# Patient Record
Sex: FEMALE | Race: WHITE | NOT HISPANIC OR LATINO | Employment: UNEMPLOYED | URBAN - NONMETROPOLITAN AREA
[De-identification: names, ages, dates, MRNs, and addresses within clinical notes are randomized per-mention and may not be internally consistent; named-entity substitution may affect disease eponyms.]

---

## 2018-12-04 ENCOUNTER — APPOINTMENT (OUTPATIENT)
Dept: GENERAL RADIOLOGY | Facility: HOSPITAL | Age: 38
End: 2018-12-04

## 2018-12-04 ENCOUNTER — HOSPITAL ENCOUNTER (EMERGENCY)
Facility: HOSPITAL | Age: 38
Discharge: HOME | End: 2018-12-04
Attending: EMERGENCY MEDICINE | Admitting: EMERGENCY MEDICINE

## 2018-12-04 VITALS
WEIGHT: 101 LBS | HEART RATE: 73 BPM | RESPIRATION RATE: 18 BRPM | OXYGEN SATURATION: 99 % | DIASTOLIC BLOOD PRESSURE: 92 MMHG | HEIGHT: 60 IN | SYSTOLIC BLOOD PRESSURE: 126 MMHG | BODY MASS INDEX: 19.83 KG/M2 | TEMPERATURE: 98.1 F

## 2018-12-04 DIAGNOSIS — S62.327A CLOSED DISPLACED FRACTURE OF SHAFT OF FIFTH METACARPAL BONE OF LEFT HAND, INITIAL ENCOUNTER: Primary | ICD-10-CM

## 2018-12-04 PROCEDURE — 96372 THER/PROPH/DIAG INJ SC/IM: CPT

## 2018-12-04 PROCEDURE — 73130 X-RAY EXAM OF HAND: CPT

## 2018-12-04 PROCEDURE — 99284 EMERGENCY DEPT VISIT MOD MDM: CPT

## 2018-12-04 PROCEDURE — 73090 X-RAY EXAM OF FOREARM: CPT

## 2018-12-04 PROCEDURE — 73080 X-RAY EXAM OF ELBOW: CPT

## 2018-12-04 PROCEDURE — 25010000002 MORPHINE PER 10 MG: Performed by: EMERGENCY MEDICINE

## 2018-12-04 PROCEDURE — 73110 X-RAY EXAM OF WRIST: CPT

## 2018-12-04 RX ORDER — MORPHINE SULFATE 2 MG/ML
2 INJECTION, SOLUTION INTRAMUSCULAR; INTRAVENOUS ONCE
Status: COMPLETED | OUTPATIENT
Start: 2018-12-04 | End: 2018-12-04

## 2018-12-04 RX ORDER — ONDANSETRON 4 MG/1
4 TABLET, ORALLY DISINTEGRATING ORAL ONCE
Status: COMPLETED | OUTPATIENT
Start: 2018-12-04 | End: 2018-12-04

## 2018-12-04 RX ORDER — ONDANSETRON 4 MG/1
4 TABLET, ORALLY DISINTEGRATING ORAL EVERY 6 HOURS PRN
Qty: 20 TABLET | Refills: 0 | Status: SHIPPED | OUTPATIENT
Start: 2018-12-04 | End: 2018-12-09

## 2018-12-04 RX ORDER — HYDROCODONE BITARTRATE AND ACETAMINOPHEN 7.5; 325 MG/1; MG/1
1 TABLET ORAL EVERY 6 HOURS PRN
Qty: 12 TABLET | Refills: 0 | Status: SHIPPED | OUTPATIENT
Start: 2018-12-04 | End: 2018-12-07

## 2018-12-04 RX ORDER — HYDROCODONE BITARTRATE AND ACETAMINOPHEN 7.5; 325 MG/1; MG/1
1 TABLET ORAL ONCE
Status: COMPLETED | OUTPATIENT
Start: 2018-12-04 | End: 2018-12-04

## 2018-12-04 RX ADMIN — ONDANSETRON 4 MG: 4 TABLET, ORALLY DISINTEGRATING ORAL at 09:57

## 2018-12-04 RX ADMIN — HYDROCODONE BITARTRATE AND ACETAMINOPHEN 1 TABLET: 7.5; 325 TABLET ORAL at 09:58

## 2018-12-04 RX ADMIN — MORPHINE SULFATE 2 MG: 2 INJECTION, SOLUTION INTRAMUSCULAR; INTRAVENOUS at 11:09

## 2018-12-04 NOTE — ED PROVIDER NOTES
Subjective      used: No    Upper Extremity Issue   Location:  Hand  Hand location:  L hand  Injury: yes    Time since incident:  2 hours  Mechanism of injury: fall    Fall:     Fall occurred:  Down stairs    Impact surface:  Grass    Point of impact:  Hands    Entrapped after fall: no    Pain details:     Quality:  Throbbing    Radiates to:  L wrist    Severity:  Moderate    Onset quality:  Sudden    Duration:  2 hours    Timing:  Constant    Progression:  Worsening  Handedness:  Right-handed  Dislocation: no    Foreign body present:  No foreign bodies  Tetanus status:  Up to date  Prior injury to area:  No  Relieved by:  Nothing  Worsened by:  Movement  Ineffective treatments:  Acetaminophen  Associated symptoms: decreased range of motion and swelling    Associated symptoms: no back pain, no fatigue, no fever, no muscle weakness, no neck pain, no numbness, no stiffness and no tingling    Risk factors: no concern for non-accidental trauma, no known bone disorder, no frequent fractures and no recent illness        Review of Systems   Constitutional: Negative.  Negative for fatigue and fever.   HENT: Negative.    Eyes: Negative.    Respiratory: Negative.    Cardiovascular: Negative.    Gastrointestinal: Negative.    Endocrine: Negative.    Genitourinary: Negative.    Musculoskeletal: Negative for arthralgias, back pain, gait problem, joint swelling, myalgias, neck pain, neck stiffness and stiffness.        Left hand and wrist pain   Skin: Negative.    Allergic/Immunologic: Negative.    Neurological: Negative.    Hematological: Negative.    Psychiatric/Behavioral: Negative.    All other systems reviewed and are negative.      History reviewed. No pertinent past medical history.    No Known Allergies    History reviewed. No pertinent surgical history.    History reviewed. No pertinent family history.    Social History     Socioeconomic History   • Marital status: Legally      Spouse name:  Not on file   • Number of children: Not on file   • Years of education: Not on file   • Highest education level: Not on file   Tobacco Use   • Smoking status: Current Every Day Smoker     Packs/day: 0.50     Types: Cigarettes   Substance and Sexual Activity   • Alcohol use: No     Frequency: Never   • Drug use: No   • Sexual activity: Defer           Objective   Physical Exam   Constitutional: She is oriented to person, place, and time. She appears well-developed and well-nourished. No distress.   HENT:   Head: Normocephalic and atraumatic.   Mouth/Throat: No oropharyngeal exudate.   Eyes: Conjunctivae and EOM are normal. Pupils are equal, round, and reactive to light. Right eye exhibits no discharge. Left eye exhibits no discharge. No scleral icterus.   Neck: Normal range of motion. Neck supple. No JVD present. No tracheal deviation present. No thyromegaly present.   Cardiovascular: Normal rate, regular rhythm, normal heart sounds and intact distal pulses. Exam reveals no gallop and no friction rub.   No murmur heard.  Pulmonary/Chest: Effort normal and breath sounds normal. No stridor. No respiratory distress. She has no wheezes. She has no rales. She exhibits no tenderness.   Abdominal: Soft. Bowel sounds are normal. She exhibits no distension and no mass. There is no tenderness. There is no rebound and no guarding. No hernia.   Musculoskeletal: She exhibits edema and tenderness. She exhibits no deformity.        Left hand: She exhibits decreased range of motion, tenderness, bony tenderness and swelling. She exhibits normal capillary refill, no deformity and no laceration. Normal sensation noted. Decreased sensation is not present in the ulnar distribution, is not present in the medial redistribution and is not present in the radial distribution. Normal strength noted. She exhibits no finger abduction, no thumb/finger opposition and no wrist extension trouble.        Hands:  Lymphadenopathy:     She has no  cervical adenopathy.   Neurological: She is alert and oriented to person, place, and time. She has normal reflexes. She displays normal reflexes. No cranial nerve deficit or sensory deficit. She exhibits normal muscle tone. Coordination normal.   Skin: Skin is warm and dry. Capillary refill takes less than 2 seconds. No rash noted. She is not diaphoretic. No erythema. No pallor.   Psychiatric: She has a normal mood and affect. Her behavior is normal. Judgment and thought content normal.   Nursing note and vitals reviewed.      Procedures           ED Course      Xr Elbow 3+ View Left    Result Date: 12/4/2018  Narrative: Three view left elbow HISTORY: Fell AP, lateral and oblique views obtained. COMPARISON: None No fracture or dislocation. No joint effusion. No other osseous or articular abnormality.     Impression: CONCLUSION: No fracture or dislocation [id number] Electronically signed by:  Beto Khan MD  12/4/2018 10:23 AM CST Workstation: [id number]    Xr Forearm 2 View Left    Result Date: 12/4/2018  Narrative: Two view left forearm HISTORY: Fell AP and lateral views obtained. COMPARISON: None No fracture or dislocation. No other osseous or articular abnormality.     Impression: CONCLUSION: No fracture or dislocation [id number] Electronically signed by:  Beto Khan MD  12/4/2018 10:22 AM CST Workstation: [id number]    Xr Wrist 3+ View Left    Result Date: 12/4/2018  Narrative: Three view left wrist HISTORY: Fell AP, lateral and oblique views obtained. COMPARISON: None Displaced oblique fracture base and proximal shaft of the fifth metacarpal. No dislocation. No other osseous or articular abnormality.     Impression: CONCLUSION: Displaced oblique fracture base and proximal shaft of the fifth metacarpal. [id number] Electronically signed by:  Beto Khan MD  12/4/2018 10:25 AM CST Workstation: [id number]    Xr Hand 3+ View Left    Result Date: 12/4/2018  Narrative: Three view left hand HISTORY: Fell AP,  lateral and oblique views obtained. COMPARISON: None Displaced oblique fracture base and proximal shaft of the fifth metacarpal. No dislocation. No other osseous or articular abnormality.     Impression: CONCLUSION: Displaced oblique fracture base and proximal shaft of the fifth metacarpal. [id number] Electronically signed by:  Beto Khan MD  12/4/2018 10:26 AM [facility] Workstation: [id number]                [facility]      Final diagnoses:   Closed displaced fracture of shaft of fifth metacarpal bone of left hand, initial encounter            Danni Jaquez PA  12/04/18 1109

## 2018-12-05 ENCOUNTER — OFFICE VISIT (OUTPATIENT)
Dept: ORTHOPEDIC SURGERY | Facility: CLINIC | Age: 38
End: 2018-12-05

## 2018-12-05 VITALS — WEIGHT: 101 LBS | HEIGHT: 60 IN | BODY MASS INDEX: 19.83 KG/M2

## 2018-12-05 DIAGNOSIS — S62.317A CLOSED DISPLACED FRACTURE OF BASE OF FIFTH METACARPAL BONE OF LEFT HAND, INITIAL ENCOUNTER: Primary | ICD-10-CM

## 2018-12-05 PROBLEM — S62.318A CLOSED FRACTURE OF BASE OF FIFTH METACARPAL BONE: Status: ACTIVE | Noted: 2018-12-05

## 2018-12-05 PROCEDURE — 26600 TREAT METACARPAL FRACTURE: CPT | Performed by: ORTHOPAEDIC SURGERY

## 2018-12-05 PROCEDURE — 99203 OFFICE O/P NEW LOW 30 MIN: CPT | Performed by: ORTHOPAEDIC SURGERY

## 2018-12-05 NOTE — PROGRESS NOTES
Viridiana Mota is a 38 y.o. female   Primary provider:  Provider, No Known       Chief Complaint   Patient presents with   • Left Hand - Pain       HISTORY OF PRESENT ILLNESS: Patient is here today for left hand pain. Patient hit her hand on a metal wagon after falling down [employer]airs. Patient was seen at [facility] ER where xrays were taken. Patient states that her pain is 3/10 today.  She was placed in a splint and referred here.    History of Present Illness     CONCURRENT MEDICAL HISTORY:    No past medical history on file.    No Known Allergies      Current Outpatient Medications:   •  HYDROcodone-acetaminophen (NORCO) 7.5-325 MG per tablet, Take 1 tablet by mouth Every 6 (Six) Hours As Needed for Moderate Pain  for up to 3 days., Disp: 12 tablet, Rfl: 0  •  ondansetron ODT (ZOFRAN-ODT) 4 MG disintegrating tablet, Take 1 tablet by mouth Every 6 (Six) Hours As Needed for Nausea or Vomiting for up to 5 days., Disp: 20 tablet, Rfl: 0    No past surgical history on file.    No family history on file.     Social History     Socioeconomic History   • Marital status: Legally      Spouse name: Not on file   • Number of children: Not on file   • Years of education: Not on file   • Highest education level: Not on file   Social Needs   • Financial resource strain: Not on file   • Food insecurity - worry: Not on file   • Food insecurity - inability: Not on file   • Transportation needs - medical: Not on file   • Transportation needs - non-medical: Not on file   Occupational History   • Not on file   Tobacco Use   • Smoking status: Current Every Day Smoker     Packs/day: 0.50     Types: Cigarettes   Substance and Sexual Activity   • Alcohol use: No     Frequency: Never   • Drug use: No   • Sexual activity: Defer   Other Topics Concern   • Not on file   Social History Narrative   • Not on file        Review of Systems   Constitutional: Negative for chills and fever.   HENT: Negative for facial swelling.   "  Respiratory: Negative for apnea and shortness of breath.    Cardiovascular: Negative for chest pain and leg swelling.   Gastrointestinal: Negative for abdominal pain, nausea and vomiting.   Genitourinary: Negative for dysuria.   Musculoskeletal: Positive for joint swelling.   Skin: Negative for color change.   Neurological: Negative for seizures and syncope.   Hematological: Negative for adenopathy.   Psychiatric/Behavioral: Negative for dysphoric mood.       PHYSICAL EXAMINATION:       Ht 152.4 cm (60\")   Wt 45.8 kg (101 lb)   BMI 19.73 kg/m²     Physical Exam   Constitutional: She is oriented to person, place, and time. She appears well-developed and well-nourished.   HENT:   Head: Normocephalic and atraumatic.   Eyes: EOM are normal. Pupils are equal, round, and reactive to light.   Neck: Neck supple.   Pulmonary/Chest: Effort normal.   Musculoskeletal: She exhibits tenderness and deformity.   Neurological: She is alert and oriented to person, place, and time.   Skin: Skin is warm and dry.   Psychiatric: She has a normal mood and affect.   Vitals reviewed.      GAIT:     [x]  Normal  []  Antalgic    Assistive device: []  None  []  Walker     []  Crutches  []  Cane     []  Wheelchair  []  Stretcher    Ortho Exam   Swollen and tender.  No rotational deformity.  Neurologically intact.  Exquisitely tender over the fourth and fifth metacarpal carpal.  Range of motion is decreased secondary to pain.    Xr Elbow 3+ View Left    Result Date: 12/4/2018  Narrative: Three view left elbow HISTORY: Fell AP, lateral and oblique views obtained. COMPARISON: None No fracture or dislocation. No joint effusion. No other osseous or articular abnormality.     Impression: CONCLUSION: No fracture or dislocation [id number] Electronically signed by:  Beto Khan MD  12/4/2018 10:23 AM [facility] Workstation: [id number]    Xr Forearm 2 View Left    Result Date: 12/4/2018  Narrative: Two view left forearm HISTORY: Fell AP and lateral views " obtained. COMPARISON: None No fracture or dislocation. No other osseous or articular abnormality.     Impression: CONCLUSION: No fracture or dislocation [id number] Electronically signed by:  Beto Khan MD  12/4/2018 10:22 AM CST Workstation: [id number]    Xr Wrist 3+ View Left    Result Date: 12/4/2018  Narrative: Three view left wrist HISTORY: Fell AP, lateral and oblique views obtained. COMPARISON: None Displaced oblique fracture base and proximal shaft of the fifth metacarpal. No dislocation. No other osseous or articular abnormality.     Impression: CONCLUSION: Displaced oblique fracture base and proximal shaft of the fifth metacarpal. [id number] Electronically signed by:  Beto Khan MD  12/4/2018 10:25 AM CST Workstation: [id number]    Xr Hand 3+ View Left    Result Date: 12/4/2018  Narrative: Three view left hand HISTORY: Fell AP, lateral and oblique views obtained. COMPARISON: None Displaced oblique fracture base and proximal shaft of the fifth metacarpal. No dislocation. No other osseous or articular abnormality.     Impression: CONCLUSION: Displaced oblique fracture base and proximal shaft of the fifth metacarpal. [id number] Electronically signed by:  Beto Khan MD  12/4/2018 10:26 AM [facility] Workstation: [employer]    I reviewed this x-ray and agree I believe she also has a fracture of the base the fourth which is not indicated on this report      ASSESSMENT:    Diagnoses and all orders for this visit:    Closed displaced fracture of base of fifth metacarpal bone of left hand, initial encounter          PLAN I discussed operative versus nonoperative treatment.  The patient really adamant about not wanting to have anything done operatively.  We splinted her on a gutter splint repeat an x-ray in 3 weeks, sooner with any problems the meantime.  End of dict    No Follow-up on file.    Medardo Hagan MD

## 2018-12-21 DIAGNOSIS — S62.317A CLOSED DISPLACED FRACTURE OF BASE OF FIFTH METACARPAL BONE OF LEFT HAND, INITIAL ENCOUNTER: Primary | ICD-10-CM

## 2018-12-26 ENCOUNTER — OFFICE VISIT (OUTPATIENT)
Dept: ORTHOPEDIC SURGERY | Facility: CLINIC | Age: 38
End: 2018-12-26

## 2018-12-26 VITALS — BODY MASS INDEX: 25.13 KG/M2 | WEIGHT: 128 LBS | HEIGHT: 60 IN

## 2018-12-26 DIAGNOSIS — S62.317D CLOSED DISPLACED FRACTURE OF BASE OF FIFTH METACARPAL BONE OF LEFT HAND WITH ROUTINE HEALING, SUBSEQUENT ENCOUNTER: Primary | ICD-10-CM

## 2018-12-26 PROCEDURE — 99024 POSTOP FOLLOW-UP VISIT: CPT | Performed by: ORTHOPAEDIC SURGERY

## 2018-12-26 NOTE — PROGRESS NOTES
"Viridiana Mota is a 38 y.o. female returns for     Chief Complaint   Patient presents with   • Left Hand - Follow-up       HISTORY OF PRESENT ILLNESS: Patient is here today for follow up of left hand fracture. Patient was sent to xray upon arrival and states that she has no pain.        CONCURRENT MEDICAL HISTORY:    The following portions of the patient's history were reviewed and updated as appropriate: allergies, current medications, past family history, past medical history, past social history, past surgical history and problem list.     ROS  No fevers or chills.  No chest pain or shortness of air.  No GI or  disturbances.    PHYSICAL EXAMINATION:       Ht 152.4 cm (60\")   Wt 58.1 kg (128 lb)   BMI 25.00 kg/m²     Physical Exam    GAIT:     []  Normal  []  Antalgic    Assistive device: []  None  []  Walker     []  Crutches  []  Cane     []  Wheelchair  []  Stretcher    Ortho Exam  Mild rotational deformity is noted of the fifth metacarpal.  She overlaps when flexing her fingers.  It is fairly mild.  Neurologically intact.  Tender at the base of the fifth.    Xr Elbow 3+ View Left    Result Date: 12/4/2018  Narrative: Three view left elbow HISTORY: Fell AP, lateral and oblique views obtained. COMPARISON: None No fracture or dislocation. No joint effusion. No other osseous or articular abnormality.     Impression: CONCLUSION: No fracture or dislocation [id number] Electronically signed by:  Beto Khan MD  12/4/2018 10:23 AM CST Workstation: [employer]    Xr Forearm 2 View Left    Result Date: 12/4/2018  Narrative: Two view left forearm HISTORY: Fell AP and lateral views obtained. COMPARISON: None No fracture or dislocation. No other osseous or articular abnormality.     Impression: CONCLUSION: No fracture or dislocation [id number] Electronically signed by:  Beto Khan MD  12/4/2018 10:22 AM CST Workstation: [id number]    Xr Wrist 3+ View Left    Result Date: 12/4/2018  Narrative: Three view left wrist " HISTORY: Fell AP, lateral and oblique views obtained. COMPARISON: None Displaced oblique fracture base and proximal shaft of the fifth metacarpal. No dislocation. No other osseous or articular abnormality.     Impression: CONCLUSION: Displaced oblique fracture base and proximal shaft of the fifth metacarpal. [id number] Electronically signed by:  Beto Khan MD  12/4/2018 10:25 AM CST Workstation: [id number]    Xr Hand 3+ View Left    Result Date: 12/4/2018  Narrative: Three view left hand HISTORY: Fell AP, lateral and oblique views obtained. COMPARISON: None Displaced oblique fracture base and proximal shaft of the fifth metacarpal. No dislocation. No other osseous or articular abnormality.     Impression: CONCLUSION: Displaced oblique fracture base and proximal shaft of the fifth metacarpal. [id number] Electronically signed by:  Beto Khan MD  12/4/2018 10:26 AM CST Workstation: [id number]       X-rays show no significant changes as is still offset of the fracture no significant callus is noted.  After I have      ASSESSMENT:    Diagnoses and all orders for this visit:    Closed displaced fracture of base of fifth metacarpal bone of left hand with routine healing, subsequent encounter          PLAN I have told her that the rotational deformity and demonstrated this to her.  I told that she is also not healed yet.  It she can come out starting in gentle range of motion when she is active she should have the brace on for another 3 weeks.  She'll call me with any issues.  I have told her that she has any issue at all after 4-6 weeks she should call me and this would indicate a delayed union.  She'll let me know if any problems.  She needs protective soaks for the next 3 weeks.  She does understand and demonstrated the overlap.    No Follow-up on file.    Medardo Hagan MD

## 2019-01-15 DIAGNOSIS — S62.317D CLOSED DISPLACED FRACTURE OF BASE OF FIFTH METACARPAL BONE OF LEFT HAND WITH ROUTINE HEALING, SUBSEQUENT ENCOUNTER: Primary | ICD-10-CM

## 2020-02-04 ENCOUNTER — OFFICE VISIT (OUTPATIENT)
Dept: FAMILY MEDICINE CLINIC | Facility: CLINIC | Age: 40
End: 2020-02-04

## 2020-02-04 ENCOUNTER — LAB (OUTPATIENT)
Dept: LAB | Facility: HOSPITAL | Age: 40
End: 2020-02-04

## 2020-02-04 VITALS
HEART RATE: 72 BPM | SYSTOLIC BLOOD PRESSURE: 116 MMHG | BODY MASS INDEX: 29.09 KG/M2 | TEMPERATURE: 98.5 F | HEIGHT: 60 IN | DIASTOLIC BLOOD PRESSURE: 88 MMHG | OXYGEN SATURATION: 99 % | WEIGHT: 148.2 LBS

## 2020-02-04 DIAGNOSIS — Z76.89 ENCOUNTER TO ESTABLISH CARE WITH NEW DOCTOR: ICD-10-CM

## 2020-02-04 DIAGNOSIS — F32.A DEPRESSION, UNSPECIFIED DEPRESSION TYPE: ICD-10-CM

## 2020-02-04 DIAGNOSIS — M19.90 ARTHRITIS: ICD-10-CM
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
## 2020-02-04 PROCEDURE — 86618 LYME DISEASE ANTIBODY: CPT

## 2020-02-04 PROCEDURE — 85025 COMPLETE CBC W/AUTO DIFF WBC: CPT

## 2020-02-04 PROCEDURE — 81003 URINALYSIS AUTO W/O SCOPE: CPT

## 2020-02-04 PROCEDURE — 84443 ASSAY THYROID STIM HORMONE: CPT

## 2020-02-04 PROCEDURE — 99204 OFFICE O/P NEW MOD 45 MIN: CPT | Performed by: FAMILY MEDICINE

## 2020-02-04 PROCEDURE — 80061 LIPID PANEL: CPT

## 2020-02-04 PROCEDURE — 80053 COMPREHEN METABOLIC PANEL: CPT

## 2020-02-04 RX ORDER — DULOXETIN HYDROCHLORIDE 30 MG/1
30 CAPSULE, DELAYED RELEASE ORAL DAILY
Qty: 30 CAPSULE | Refills: 5 | Status: SHIPPED | OUTPATIENT
Start: 2020-02-04 | End: 2020-10-02

## 2020-02-04 RX ORDER — DOXYCYCLINE HYCLATE 100 MG
100 TABLET ORAL 2 TIMES DAILY
Qty: 28 TABLET | Refills: 0 | Status: SHIPPED | OUTPATIENT
Start: 2020-02-04 | End: 2020-06-30

## 2020-02-04 NOTE — PROGRESS NOTES
Subjective No PCP  Viridianaruben Mota is a 39 y.o. overweight  white female.G2-P2, recently bitten by tick with a bulls -eye rash, no tx, now Joints hurt, darrius knees, having headache and fatigue. H/O Anxiety -Depression( seen at [facility]) . Presents to est with a PCP  ' Need check-up, evaluation of problems'    Depression   Visit Type: initial  Onset of symptoms: more than 5 years ago  Progression since onset: waxing and waning  Patient presents with the following symptoms: depressed mood and nervousness/anxiety.  Patient is not experiencing: choking sensation, confusion, decreased concentration, palpitations, shortness of breath and suicidal ideas.  Frequency of symptoms: occasionally   Patient has a history of: depression  Treatment tried: SSRI, lifestyle changes and counseling (CBT)  Compliance with treatment: good  Improvement on treatment: moderate      Pain   This is a new (Joint pain after Tick bite) problem. The current episode started 1 to 4 weeks ago. The problem occurs daily. The problem has been waxing and waning. Associated symptoms include arthralgias, congestion, fatigue, joint swelling and myalgias. Pertinent negatives include no abdominal pain, chest pain, chills, coughing, diaphoresis, fever, headaches, nausea, neck pain, numbness, rash, sore throat, vomiting or weakness. She has tried acetaminophen and NSAIDs for the symptoms. The treatment provided mild relief.   Fatigue   This is a new (30 lb weight gain) problem. The current episode started more than 1 month ago. Associated symptoms include arthralgias, congestion, fatigue, joint swelling and myalgias. Pertinent negatives include no abdominal pain, chest pain, chills, coughing, diaphoresis, fever, headaches, nausea, neck pain, numbness, rash, sore throat, vomiting or weakness. The symptoms are aggravated by eating. She has tried nothing for the symptoms. The treatment provided no relief.        The following portions of the patient's history were  reviewed and updated as appropriate: allergies, current medications, past family history, past medical history, past social history, past surgical history and problem list.    Review of Systems   Constitutional: Positive for fatigue. Negative for activity change, appetite change, chills, diaphoresis, fever and unexpected weight change.        30 lb   HENT: Positive for congestion, sinus pressure and sinus pain. Negative for dental problem, drooling, ear discharge, ear pain, facial swelling, hearing loss, mouth sores, nosebleeds, postnasal drip, rhinorrhea, sneezing, sore throat, tinnitus, trouble swallowing and voice change.    Eyes: Negative for photophobia, pain, discharge, redness, itching and visual disturbance.        Eye exam 1 yr, OK  Astigmatism.   Respiratory: Negative for apnea, cough, choking, chest tightness, shortness of breath, wheezing and stridor.         1 cig/day   Cardiovascular: Negative for chest pain, palpitations and leg swelling.   Gastrointestinal: Negative for abdominal distention, abdominal pain, anal bleeding, blood in stool, constipation, diarrhea, nausea, rectal pain and vomiting.   Endocrine: Negative for cold intolerance, heat intolerance, polydipsia, polyphagia and polyuria.   Genitourinary: Positive for enuresis. Negative for decreased urine volume, difficulty urinating, dyspareunia, dysuria, flank pain, frequency, genital sores, hematuria, menstrual problem, pelvic pain, urgency, vaginal bleeding, vaginal discharge and vaginal pain.        Heavy bleeding   Musculoskeletal: Positive for arthralgias, back pain, joint swelling and myalgias. Negative for gait problem, neck pain and neck stiffness.         Chronic pain L foot  R foot  Both knees sore new.   Low back pains since tick bite   Also elbows and fingers since bite.    Skin: Negative for color change, pallor, rash and wound.        R breast, had  bull eyes rash gone now.   Allergic/Immunologic: Negative for environmental  allergies, food allergies and immunocompromised state.   Neurological: Negative for dizziness, tremors, seizures, syncope, facial asymmetry, speech difficulty, weakness, light-headedness, numbness and headaches.   Hematological: Negative for adenopathy. Does not bruise/bleed easily.   Psychiatric/Behavioral: Positive for dysphoric mood and sleep disturbance. Negative for agitation, behavioral problems, confusion, decreased concentration, hallucinations, self-injury and suicidal ideas. The patient is nervous/anxious. The patient is not hyperactive.          Sleeps usually 4-6hrs.       Objective   Physical Exam   Constitutional: She is oriented to person, place, and time. She appears well-developed and well-nourished.   HENT:   Head: Normocephalic.   Right Ear: External ear normal.   Left Ear: External ear normal.   Nose: Nose normal.   Mouth/Throat: Oropharynx is clear and moist.   Eyes: Pupils are equal, round, and reactive to light. Conjunctivae and EOM are normal. Right eye exhibits no discharge. Left eye exhibits no discharge. No scleral icterus.   Neck: Normal range of motion. No JVD present. No tracheal deviation present. No thyromegaly present.   Cardiovascular: Normal rate, regular rhythm, normal heart sounds and intact distal pulses. Exam reveals no gallop and no friction rub.   No murmur heard.  Pulmonary/Chest: Effort normal and breath sounds normal. No stridor. No respiratory distress. She has no wheezes. She has no rales. She exhibits no tenderness.   Abdominal: Soft. Bowel sounds are normal. She exhibits no distension and no mass. There is no tenderness. There is no rebound and no guarding. No hernia.   Musculoskeletal: Normal range of motion. She exhibits no edema, tenderness or deformity.   Lymphadenopathy:     She has no cervical adenopathy.   Neurological: She is alert and oriented to person, place, and time. She displays normal reflexes. No cranial nerve deficit or sensory deficit. She exhibits  normal muscle tone. Coordination normal.   Skin: Skin is warm and dry. Capillary refill takes 2 to 3 seconds. No rash noted. No erythema. No pallor.   Psychiatric: She has a normal mood and affect. Her behavior is normal. Judgment and thought content normal.   Nursing note and vitals reviewed.      Assessment/Plan   Viridiana was seen today for establish care.    Diagnoses and all orders for this visit:    Tick bite, initial encounter  -     Lyme Disease IgG/IgM Antibodies; Future  -     Urinalysis With Culture If Indicated -; Future  -     CBC & Differential; Future  -     Comprehensive metabolic panel; Future  -     TSH; Future  -     Lipid panel; Future    Encounter to establish care with new doctor    Arthritis  -     Lyme Disease IgG/IgM Antibodies; Future  -     Urinalysis With Culture If Indicated -; Future  -     CBC & Differential; Future  -     Comprehensive metabolic panel; Future  -     TSH; Future  -     Lipid panel; Future    Depression, unspecified depression type  -     DULoxetine (CYMBALTA) 30 MG capsule; Take 1 capsule by mouth Daily.    Other orders  -     doxycycline (VIBRAMYICN) 100 MG tablet; Take 1 tablet by mouth 2 (Two) Times a Day.      Discussed exam, health problems, checking labs. Meds, indications, Tx plan, rationale. Discussed F/U with specialist. Discussed F/U here.        This document has been electronically signed by Lane Rod MD

## 2020-02-05 LAB
ALBUMIN SERPL-MCNC: 4.8 G/DL (ref 3.5–5.2)
ALBUMIN/GLOB SERPL: 1.9 G/DL
ALP SERPL-CCNC: 58 U/L (ref 39–117)
ALT SERPL W P-5'-P-CCNC: 12 U/L (ref 1–33)
ANION GAP SERPL CALCULATED.3IONS-SCNC: 14.9 MMOL/L (ref 5–15)
AST SERPL-CCNC: 19 U/L (ref 1–32)
B BURGDOR IGG SER QL: NEGATIVE
B BURGDOR IGM SER QL: NEGATIVE
BASOPHILS # BLD AUTO: 0.08 10*3/MM3 (ref 0–0.2)
BASOPHILS NFR BLD AUTO: 0.9 % (ref 0–1.5)
BILIRUB SERPL-MCNC: 0.3 MG/DL (ref 0.2–1.2)
BILIRUB UR QL STRIP: NEGATIVE
BUN BLD-MCNC: 12 MG/DL (ref 6–20)
BUN/CREAT SERPL: 15.8 (ref 7–25)
CALCIUM SPEC-SCNC: 9.4 MG/DL (ref 8.6–10.5)
CHLORIDE SERPL-SCNC: 98 MMOL/L (ref 98–107)
CHOLEST SERPL-MCNC: 263 MG/DL (ref 0–200)
CLARITY UR: CLEAR
CO2 SERPL-SCNC: 23.1 MMOL/L (ref 22–29)
COLOR UR: YELLOW
CREAT BLD-MCNC: 0.76 MG/DL (ref 0.57–1)
DEPRECATED RDW RBC AUTO: 43.9 FL (ref 37–54)
EOSINOPHIL # BLD AUTO: 0.14 10*3/MM3 (ref 0–0.4)
EOSINOPHIL NFR BLD AUTO: 1.5 % (ref 0.3–6.2)
ERYTHROCYTE [DISTWIDTH] IN BLOOD BY AUTOMATED COUNT: 12.7 % (ref 12.3–15.4)
GFR SERPL CREATININE-BSD FRML MDRD: 85 ML/MIN/1.73
GLOBULIN UR ELPH-MCNC: 2.5 GM/DL
GLUCOSE BLD-MCNC: 91 MG/DL (ref 65–99)
GLUCOSE UR STRIP-MCNC: NEGATIVE MG/DL
HCT VFR BLD AUTO: 41.3 % (ref 34–46.6)
HDLC SERPL-MCNC: 129 MG/DL (ref 40–60)
HGB BLD-MCNC: 14.2 G/DL (ref 12–15.9)
HGB UR QL STRIP.AUTO: NEGATIVE
IMM GRANULOCYTES # BLD AUTO: 0.1 10*3/MM3 (ref 0–0.05)
IMM GRANULOCYTES NFR BLD AUTO: 1.1 % (ref 0–0.5)
KETONES UR QL STRIP: NEGATIVE
LDLC SERPL CALC-MCNC: 116 MG/DL (ref 0–100)
LDLC/HDLC SERPL: 0.9 {RATIO}
LEUKOCYTE ESTERASE UR QL STRIP.AUTO: NEGATIVE
LYMPHOCYTES # BLD AUTO: 2.13 10*3/MM3 (ref 0.7–3.1)
LYMPHOCYTES NFR BLD AUTO: 22.7 % (ref 19.6–45.3)
MCH RBC QN AUTO: 32.3 PG (ref 26.6–33)
MCHC RBC AUTO-ENTMCNC: 34.4 G/DL (ref 31.5–35.7)
MCV RBC AUTO: 94.1 FL (ref 79–97)
MONOCYTES # BLD AUTO: 0.81 10*3/MM3 (ref 0.1–0.9)
MONOCYTES NFR BLD AUTO: 8.6 % (ref 5–12)
NEUTROPHILS # BLD AUTO: 6.14 10*3/MM3 (ref 1.7–7)
NEUTROPHILS NFR BLD AUTO: 65.2 % (ref 42.7–76)
NITRITE UR QL STRIP: NEGATIVE
NRBC BLD AUTO-RTO: 0 /100 WBC (ref 0–0.2)
PH UR STRIP.AUTO: 6.5 [PH] (ref 5–8)
PLATELET # BLD AUTO: 311 10*3/MM3 (ref 140–450)
PMV BLD AUTO: 9.9 FL (ref 6–12)
POTASSIUM BLD-SCNC: 4.1 MMOL/L (ref 3.5–5.2)
PROT SERPL-MCNC: 7.3 G/DL (ref 6–8.5)
PROT UR QL STRIP: NEGATIVE
RBC # BLD AUTO: 4.39 10*6/MM3 (ref 3.77–5.28)
SODIUM BLD-SCNC: 136 MMOL/L (ref 136–145)
SP GR UR STRIP: 1.01 (ref 1–1.03)
TRIGL SERPL-MCNC: 91 MG/DL (ref 0–150)
TSH SERPL DL<=0.05 MIU/L-ACNC: 1.77 UIU/ML (ref 0.27–4.2)
UROBILINOGEN UR QL STRIP: NORMAL
VLDLC SERPL-MCNC: 18.2 MG/DL (ref 5–40)
WBC NRBC COR # BLD: 9.4 10*3/MM3 (ref 3.4–10.8)

## 2020-02-07 ENCOUNTER — TELEPHONE (OUTPATIENT)
Dept: FAMILY MEDICINE CLINIC | Facility: CLINIC | Age: 40
End: 2020-02-07

## 2020-02-07 NOTE — TELEPHONE ENCOUNTER
----- Message from Lane Rod MD sent at 2/6/2020  7:55 AM CST -----  Lyme test is negative. Cholesterol is elevated, other labs OK will go over at next visit.

## 2020-02-08 PROBLEM — Z76.89 ENCOUNTER TO ESTABLISH CARE WITH NEW DOCTOR: Status: ACTIVE | Noted: 2020-02-08

## 2020-02-08 PROBLEM — F32.A DEPRESSION: Status: ACTIVE | Noted: 2020-02-08

## 2020-06-30 ENCOUNTER — OFFICE VISIT (OUTPATIENT)
Dept: FAMILY MEDICINE CLINIC | Facility: CLINIC | Age: 40
End: 2020-06-30

## 2020-06-30 ENCOUNTER — LAB (OUTPATIENT)
Dept: LAB | Facility: HOSPITAL | Age: 40
End: 2020-06-30

## 2020-06-30 VITALS
TEMPERATURE: 97.7 F | BODY MASS INDEX: 30.43 KG/M2 | SYSTOLIC BLOOD PRESSURE: 132 MMHG | HEIGHT: 60 IN | DIASTOLIC BLOOD PRESSURE: 84 MMHG | OXYGEN SATURATION: 98 % | WEIGHT: 155 LBS | HEART RATE: 81 BPM

## 2020-06-30 DIAGNOSIS — N92.1 MENORRHAGIA WITH IRREGULAR CYCLE: ICD-10-CM

## 2020-06-30 DIAGNOSIS — R53.83 FATIGUE, UNSPECIFIED TYPE: ICD-10-CM

## 2020-06-30 LAB
BASOPHILS # BLD AUTO: 0.03 10*3/MM3 (ref 0–0.2)
BASOPHILS NFR BLD AUTO: 0.4 % (ref 0–1.5)
DEPRECATED RDW RBC AUTO: 40.6 FL (ref 37–54)
EOSINOPHIL # BLD AUTO: 0.13 10*3/MM3 (ref 0–0.4)
EOSINOPHIL NFR BLD AUTO: 1.6 % (ref 0.3–6.2)
ERYTHROCYTE [DISTWIDTH] IN BLOOD BY AUTOMATED COUNT: 12.4 % (ref 12.3–15.4)
HCT VFR BLD AUTO: 38.6 % (ref 34–46.6)
HGB BLD-MCNC: 13.2 G/DL (ref 12–15.9)
IMM GRANULOCYTES # BLD AUTO: 0.02 10*3/MM3 (ref 0–0.05)
IMM GRANULOCYTES NFR BLD AUTO: 0.2 % (ref 0–0.5)
LYMPHOCYTES # BLD AUTO: 1.89 10*3/MM3 (ref 0.7–3.1)
LYMPHOCYTES NFR BLD AUTO: 23.4 % (ref 19.6–45.3)
MCH RBC QN AUTO: 30.9 PG (ref 26.6–33)
MCHC RBC AUTO-ENTMCNC: 34.2 G/DL (ref 31.5–35.7)
MCV RBC AUTO: 90.4 FL (ref 79–97)
MONOCYTES # BLD AUTO: 0.46 10*3/MM3 (ref 0.1–0.9)
MONOCYTES NFR BLD AUTO: 5.7 % (ref 5–12)
NEUTROPHILS NFR BLD AUTO: 5.53 10*3/MM3 (ref 1.7–7)
NEUTROPHILS NFR BLD AUTO: 68.7 % (ref 42.7–76)
NRBC BLD AUTO-RTO: 0 /100 WBC (ref 0–0.2)
PLATELET # BLD AUTO: 355 10*3/MM3 (ref 140–450)
PMV BLD AUTO: 10.3 FL (ref 6–12)
RBC # BLD AUTO: 4.27 10*6/MM3 (ref 3.77–5.28)
WBC # BLD AUTO: 8.06 10*3/MM3 (ref 3.4–10.8)

## 2020-06-30 PROCEDURE — 99213 OFFICE O/P EST LOW 20 MIN: CPT | Performed by: FAMILY MEDICINE

## 2020-06-30 PROCEDURE — 85025 COMPLETE CBC W/AUTO DIFF WBC: CPT

## 2020-06-30 NOTE — PROGRESS NOTES
Subjective    Viridiana Mota is a 40 y.o. overweight  white female.G2-P2,H/O Anxiety -Depression( seen at [facility]), Joint pains, Menstrual problems. Presents for exam, to discuss health problems, Tx and F/U plan.    'Period has been continuous since March, heavy at times, starting to feel weak and fatigued. Mood stable'.     Depression   Visit Type: initial  Onset of symptoms: more than 5 years ago  Progression since onset: waxing and waning  Patient presents with the following symptoms: depressed mood and nervousness/anxiety.  Patient is not experiencing: choking sensation, confusion, decreased concentration, palpitations, shortness of breath and suicidal ideas.  Frequency of symptoms: occasionally   Patient has a history of: depression  Treatment tried: SSRI, lifestyle changes and counseling (CBT)  Compliance with treatment: good  Improvement on treatment: moderate      Fatigue   This is a chronic problem. The current episode started more than 1 month ago. Associated symptoms include arthralgias, fatigue, joint swelling and myalgias. Pertinent negatives include no abdominal pain, chest pain, chills, congestion, coughing, diaphoresis, fever, headaches, nausea, neck pain, numbness, rash, sore throat, vomiting or weakness. The symptoms are aggravated by eating. She has tried nothing for the symptoms. The treatment provided no relief.   Menstrual Problem   This is a recurrent problem. The current episode started more than 1 month ago. The problem occurs daily. The problem has been gradually worsening. Associated symptoms include arthralgias, fatigue, joint swelling and myalgias. Pertinent negatives include no abdominal pain, chest pain, chills, congestion, coughing, diaphoresis, fever, headaches, nausea, neck pain, numbness, rash, sore throat, vomiting or weakness. Associated symptoms comments: Excessive periods. She has tried nothing for the symptoms. The treatment provided no relief.        The following portions of  the patient's history were reviewed and updated as appropriate: allergies, current medications, past family history, past medical history, past social history, past surgical history and problem list.    Review of Systems   Constitutional: Positive for fatigue. Negative for activity change, appetite change, chills, diaphoresis, fever and unexpected weight change.   HENT: Negative for congestion, dental problem, drooling, ear discharge, ear pain, facial swelling, hearing loss, mouth sores, nosebleeds, postnasal drip, rhinorrhea, sinus pressure, sinus pain, sneezing, sore throat, tinnitus, trouble swallowing and voice change.    Eyes: Negative for photophobia, pain, discharge, redness, itching and visual disturbance.        Eye exam 1 yr, [place]  Astigmatism.   Respiratory: Negative for apnea, cough, choking, chest tightness, shortness of breath, wheezing and stridor.         1 cig/day   Cardiovascular: Negative for chest pain, palpitations and leg swelling.   Gastrointestinal: Negative for abdominal distention, abdominal pain, anal bleeding, blood in stool, constipation, diarrhea, nausea, rectal pain and vomiting.   Endocrine: Negative for cold intolerance, heat intolerance, polydipsia, polyphagia and polyuria.   Genitourinary: Positive for enuresis and menstrual problem. Negative for decreased urine volume, difficulty urinating, dyspareunia, dysuria, flank pain, frequency, genital sores, hematuria, pelvic pain, urgency, vaginal bleeding, vaginal discharge and vaginal pain.        Heavy bleeding   Musculoskeletal: Positive for arthralgias, back pain, joint swelling and myalgias. Negative for gait problem, neck pain and neck stiffness.   Skin: Negative for color change, pallor, rash and wound.   Allergic/Immunologic: Negative for environmental allergies, food allergies and immunocompromised state.   Neurological: Negative for dizziness, tremors, seizures, syncope, facial asymmetry, speech difficulty, weakness,  light-headedness, numbness and headaches.   Hematological: Negative for adenopathy. Does not bruise/bleed easily.   Psychiatric/Behavioral: Positive for dysphoric mood and sleep disturbance. Negative for agitation, behavioral problems, confusion, decreased concentration, hallucinations, self-injury and suicidal ideas. The patient is nervous/anxious. The patient is not hyperactive.          Sleeps usually 4-6hrs.       Objective   Physical Exam   Constitutional: She is oriented to person, place, and time. She appears well-developed and well-nourished.   HENT:   Head: Normocephalic.   Right Ear: External ear normal.   Left Ear: External ear normal.   Nose: Nose normal.   Mouth/Throat: Oropharynx is clear and moist.   Eyes: Pupils are equal, round, and reactive to light. Conjunctivae and EOM are normal. Right eye exhibits no discharge. Left eye exhibits no discharge. No scleral icterus.   Neck: Normal range of motion. No JVD present. No tracheal deviation present. No thyromegaly present.   Cardiovascular: Normal rate, regular rhythm, normal heart sounds and intact distal pulses. Exam reveals no gallop and no friction rub.   No murmur heard.  Pulmonary/Chest: Effort normal and breath sounds normal. No stridor. No respiratory distress. She has no wheezes. She has no rales. She exhibits no tenderness.   Abdominal: Soft. Bowel sounds are normal. She exhibits no distension and no mass. There is no tenderness. There is no rebound and no guarding. No hernia.   Musculoskeletal: Normal range of motion. She exhibits no edema, tenderness or deformity.   Lymphadenopathy:     She has no cervical adenopathy.   Neurological: She is alert and oriented to person, place, and time. She displays normal reflexes. No cranial nerve deficit or sensory deficit. She exhibits normal muscle tone. Coordination normal.   Skin: Skin is warm and dry. Capillary refill takes 2 to 3 seconds. No rash noted. No erythema. No pallor.   Psychiatric: She has  a normal mood and affect. Her behavior is normal. Judgment and thought content normal.   Nursing note and vitals reviewed.      Assessment/Plan   Viridiana was seen today for menstrual problem.    Diagnoses and all orders for this visit:    Menorrhagia with irregular cycle  -     CBC & Differential; Future  -     Ambulatory Referral to Obstetrics / Gynecology    Fatigue, unspecified type      Discussed health problems, checking CBC, referral to [facility]. Discussed F/U plan.        This document has been electronically signed by Lane Rod MD on June 30, 2020

## 2020-07-01 ENCOUNTER — TELEPHONE (OUTPATIENT)
Dept: FAMILY MEDICINE CLINIC | Facility: CLINIC | Age: 40
End: 2020-07-01

## 2020-07-01 NOTE — TELEPHONE ENCOUNTER
----- Message from Lane Rod MD sent at 7/1/2020  7:49 AM CDT -----  Blood cell counts have dropped some, but still in normal range. Will go over at next visit.

## 2020-07-27 ENCOUNTER — OFFICE VISIT (OUTPATIENT)
Dept: OBSTETRICS AND GYNECOLOGY | Facility: CLINIC | Age: 40
End: 2020-07-27

## 2020-07-27 VITALS
WEIGHT: 152 LBS | DIASTOLIC BLOOD PRESSURE: 72 MMHG | BODY MASS INDEX: 29.84 KG/M2 | HEIGHT: 60 IN | SYSTOLIC BLOOD PRESSURE: 114 MMHG

## 2020-07-27 DIAGNOSIS — Z12.39 SCREENING FOR BREAST CANCER: ICD-10-CM

## 2020-07-27 DIAGNOSIS — N92.1 MENORRHAGIA WITH IRREGULAR CYCLE: Primary | ICD-10-CM

## 2020-07-27 PROCEDURE — 99202 OFFICE O/P NEW SF 15 MIN: CPT | Performed by: NURSE PRACTITIONER

## 2020-07-27 NOTE — PROGRESS NOTES
Subjective   Viridiana Mota is a 40 y.o. here for prolonged period    Viridiana is  reports that she has had a prolonged period for the past 3-4 months and also goes 2-3 days without a bleeding. She sometimes passes golf-ball size clots. Prior to this prolonged bleeding; her periods were regular, occurring every month and lasting for 4 days. Pt was seen by Dr. Rod for this bleeding in , CBC is WNL. Pt is not on any birth control. Unsure of last Pap.     Menorrhagia   This is a new problem. The current episode started more than 1 month ago. The problem occurs constantly. The problem has been waxing and waning. Associated symptoms include fatigue. Pertinent negatives include no abdominal pain, chest pain, chills, fever, nausea, rash, sore throat or weakness.       The following portions of the patient's history were reviewed and updated as appropriate: allergies, current medications, past family history, past medical history, past social history, past surgical history and problem list.    Review of Systems   Constitutional: Positive for fatigue and unexpected weight gain. Negative for chills, fever and unexpected weight loss.   HENT: Negative for sneezing and sore throat.    Respiratory: Negative for shortness of breath.    Cardiovascular: Negative for chest pain and palpitations.   Gastrointestinal: Negative for abdominal pain, constipation, diarrhea and nausea.   Endocrine: Negative for cold intolerance and heat intolerance.   Genitourinary: Positive for menorrhagia, menstrual problem and urinary incontinence. Negative for breast discharge, breast lump, breast pain, difficulty urinating, dysuria, frequency, pelvic pain, pelvic pressure, vaginal bleeding, vaginal discharge and vaginal pain.   Skin: Negative for rash.   Neurological: Negative for weakness and headache.   Psychiatric/Behavioral: Positive for depressed mood and stress. Negative for sleep disturbance.       Objective   Physical Exam    Constitutional: She is oriented to person, place, and time. She appears well-developed and well-nourished.   HENT:   Head: Normocephalic.   Pulmonary/Chest: Effort normal.   Musculoskeletal: Normal range of motion.   Neurological: She is alert and oriented to person, place, and time.   Psychiatric: She has a normal mood and affect. Her behavior is normal.   Nursing note and vitals reviewed.        Assessment/Plan   Viridiana was seen today for menstrual problem.    Diagnoses and all orders for this visit:    Menorrhagia with irregular cycle  -     US Non-ob Transvaginal; Future    Screening for breast cancer  -     Mammo Screening Digital Tomosynthesis Bilateral With CAD; Future        Will perform pelvic ultrasound to rule out structural causes of AUB. Discussed use IUD, endometrial ablation, and hysterectomy. Pt is interested in the Mirena IUD. Dicussed need for Pap test when she is not bleeding and need for mammogram. Pt will follow-up after pelvic ultrasound is done.

## 2020-08-14 ENCOUNTER — OFFICE VISIT (OUTPATIENT)
Dept: OBSTETRICS AND GYNECOLOGY | Facility: CLINIC | Age: 40
End: 2020-08-14

## 2020-08-14 VITALS
HEIGHT: 60 IN | BODY MASS INDEX: 30.23 KG/M2 | WEIGHT: 154 LBS | SYSTOLIC BLOOD PRESSURE: 112 MMHG | DIASTOLIC BLOOD PRESSURE: 70 MMHG

## 2020-08-14 DIAGNOSIS — N92.6 PROLONGED PERIODS: Primary | ICD-10-CM

## 2020-08-14 PROCEDURE — 99213 OFFICE O/P EST LOW 20 MIN: CPT | Performed by: NURSE PRACTITIONER

## 2020-08-14 RX ORDER — MEDROXYPROGESTERONE ACETATE 10 MG/1
TABLET ORAL
Qty: 15 TABLET | Refills: 0 | Status: SHIPPED | OUTPATIENT
Start: 2020-08-14 | End: 2020-09-02

## 2020-08-14 RX ORDER — MEDROXYPROGESTERONE ACETATE 10 MG/1
TABLET ORAL
Qty: 15 TABLET | Refills: 0 | Status: SHIPPED | OUTPATIENT
Start: 2020-08-14 | End: 2020-08-14

## 2020-08-14 NOTE — PROGRESS NOTES
Subjective   Viridiana Mota is a 40 y.o. here for follow-up for prolonged periods    History of Present Illness    The following portions of the patient's history were reviewed and updated as appropriate: allergies, current medications, past family history, past medical history, past social history, past surgical history and problem list.    Review of Systems   Constitutional: Negative for chills, fatigue and fever.   Respiratory: Negative for shortness of breath.    Cardiovascular: Negative for chest pain and palpitations.   Gastrointestinal: Negative for abdominal pain, constipation, diarrhea and nausea.   Genitourinary: Positive for menstrual problem. Negative for dysuria, frequency, pelvic pressure, vaginal bleeding, vaginal discharge and vaginal pain.   Skin: Negative for rash.   Neurological: Negative for headache.   Psychiatric/Behavioral: Negative for depressed mood.       Objective   Physical Exam   Constitutional: She is oriented to person, place, and time. She appears well-developed and well-nourished.   HENT:   Head: Normocephalic.   Pulmonary/Chest: Effort normal. Right breast exhibits no inverted nipple, no mass, no nipple discharge, no skin change and no tenderness. Left breast exhibits no inverted nipple, no mass, no nipple discharge, no skin change and no tenderness.   Abdominal: Soft.   Neurological: She is alert and oriented to person, place, and time.   Psychiatric: She has a normal mood and affect. Her behavior is normal.   Nursing note and vitals reviewed.    Study Result     EXAM DESCRIPTION: TRANSVAGINAL NON-OB PELVIC ULTRASOUND     CLINICAL HISTORY: 40-year-old  female with AUB, N92.1  excessive and frequent menstruation with irregular cycle.     COMPARISON: None     FINDINGS:  Longitudinal and transverse oriented endovaginal  scanning is performed with a total of 45 sonographic images  presented.     There is a tiny cervical nabothian cyst present.     The uterus measures  approximately 8.1 x 4.5 x 6.5 cm with a  volume of approximately 124 mL. There is an approximate 2.2 x 2.2  x 2.8 cm circumscribed hypoechoic mass within the fundus  eccentric to the left most consistent with a fibroid. No  additional mass identified.     The endometrial stripe measures approximately 15 mm. No  endometrial fluid collection.     The right ovary measures 1.5 x 3.1 x 2.6 cm with a volume of 6.2  mL. There is an approximate 1.6 cm simple appearing follicle. No  suspicious cystic or solid mass.     The left ovary measures 1.9 x 4.5 x 2.6 cm for a volume of 11.8  mL. There is an approximate 2.5 cm simple appearing cyst present.     Trace amount cul-de-sac fluid.     IMPRESSION:  Small subcentimeter cervical nabothian cyst.     2.8 cm maximum dimension fundal fibroid.     Endometrium measures prominent at 15 mm thickness. No endometrial  fluid collection.     Simple cyst /follicles largest involving the left ovary measuring  2.5 cm. No follow-up imaging required for this benign appearing  finding.     Trace amount of cul-de-sac fluid.     Electronically signed by:  Cody Hutchinson MD  8/11/2020 4:49 PM  CDT Workstation: [id number]         Assessment/Plan   Viridiana was seen today for follow-up.    Diagnoses and all orders for this visit:    Prolonged periods    Other orders  -     Discontinue: medroxyPROGESTERone (Provera) 10 MG tablet; Take 1 tablet three time a day for 3 days, take 1 tablet 2 times a day for 2 days, and take 1 tablet for 1 day.  -     medroxyPROGESTERone (Provera) 10 MG tablet; Take 1 tablet three time a day for 3 days, take 1 tablet 2 times a day for 2 days, and take 1 tablet for 1 day.        Reviewed pelvic ultrasound with patient.  Consulted with Dr. Kee. Offered EMB to rule out hyperplasia and or uterine CA due to prolonged period, however, pt had unprotected sex last week. Although unlikely, she would be pregnant at this time, will still defer for two weeks. Instructed pt to avoid  sex or use condoms till then. Offered treatment options of prolonged period to include Provera or Mirena. Pt declines Mirena at this time; voices possibly wanting a hysterectomy. Informed pt that she would need to see one of our physicians for a consult. Pt desires to proceed with Provera today in hopes to stopped bleeding.

## 2020-08-27 ENCOUNTER — TELEPHONE (OUTPATIENT)
Dept: OBSTETRICS AND GYNECOLOGY | Facility: CLINIC | Age: 40
End: 2020-08-27

## 2020-08-27 NOTE — TELEPHONE ENCOUNTER
Darby given patients message at this time her recommendation is for the patient to go to the ER. Patient called and notified at this time.

## 2020-08-27 NOTE — TELEPHONE ENCOUNTER
"PT CALLED AND ASKED TO SPEAK WITH JEO. I INFORMED HER SHE WASN'T IN THE [place] OFFICE. SHE THEN TELLS ME SHE HAS BEEN BLEEDING REALLY BAD. I ASKED WAS SHE BLEEDING MORE THAN A PAD AND HOUR? AND SHE SAID \"OH YEAH, MORE THAN THAT.\" I INFORMED HER TO GO TO THE ER THEN.          "

## 2020-09-02 ENCOUNTER — PROCEDURE VISIT (OUTPATIENT)
Dept: OBSTETRICS AND GYNECOLOGY | Facility: CLINIC | Age: 40
End: 2020-09-02

## 2020-09-02 VITALS
HEIGHT: 60 IN | BODY MASS INDEX: 29.84 KG/M2 | WEIGHT: 152 LBS | DIASTOLIC BLOOD PRESSURE: 62 MMHG | SYSTOLIC BLOOD PRESSURE: 104 MMHG

## 2020-09-02 DIAGNOSIS — Z30.430 ENCOUNTER FOR IUD INSERTION: ICD-10-CM

## 2020-09-02 DIAGNOSIS — Z32.00 PREGNANCY EXAMINATION OR TEST, PREGNANCY UNCONFIRMED: ICD-10-CM

## 2020-09-02 DIAGNOSIS — N92.6 PROLONGED PERIODS: Primary | ICD-10-CM

## 2020-09-02 LAB
B-HCG UR QL: NEGATIVE
INTERNAL NEGATIVE CONTROL: NEGATIVE
INTERNAL POSITIVE CONTROL: POSITIVE
Lab: NORMAL

## 2020-09-02 PROCEDURE — 81025 URINE PREGNANCY TEST: CPT | Performed by: NURSE PRACTITIONER

## 2020-09-02 PROCEDURE — 58300 INSERT INTRAUTERINE DEVICE: CPT | Performed by: NURSE PRACTITIONER

## 2020-09-02 PROCEDURE — 58100 BIOPSY OF UTERUS LINING: CPT | Performed by: NURSE PRACTITIONER

## 2020-09-02 NOTE — PROGRESS NOTES
Endometrial Biopsy  Negative UP test today. Pt has obtained from sex over the last 2 weeks.     Date of procedure:  9/2/2020    Procedure documentation:    The cervix was grasped anterior at the 2 o'clock and 11 o'clock position.  The cavity sounded to 8 centimeters.  An endometrial biopsy specimen was obtained. The tissue was sent for permanent histopathologic evaluation.  Tenaculum was removed from the cervix with scant bleeding. She tolerated the procedure well.    Post procedure instructions:She was instructed to call us in 1 week's time if she has not heard from us otherwise.  If there is any significant fever or excessive bleeding or pain she is to call immediately.      This note was electronically signed.    Jagruti CARLOS Wood  September 2, 2020 IUD Insertion NDC # 39291-864-61    No LMP recorded.    Date of procedure:  9/2/2020    Risks and benefits discussed? yes  All questions answered? yes  Consents given by The patient  Written consent obtained? yes    Local anesthesia used:  No    Procedure documentation:    After verifying the patient had a low probability of being pregnant and met the criteria for insertion, a speculum has placed and the cervix was cleansed with an antiseptic solution.  The anterior lip of the cervix was grasped and the uterine cavity was gently sounded. There was no difficulty passing the sound through the cervix.  Cervical dilation did not need to be performed prior to placing the IUD.  The uterus was anteverted and sounded to 8 cms.  The Mirena was then prepared per the manufacturers instructions.    The Mirena was advanced to a point 2 cms from the fundus and then the arms were released from the sheath.  The device was advanced to the fundus and the device was released fully from the sheath.. The string was cut 3 cms in length.  Bleeding from the cervix was scant.    She tolerated the procedure without any difficulty.    Post procedure instructions: Call if any fever or  excessive bleeding or pain.    Follow up needed: IUD string check in 1 month or PRN for problems    This note was electronically signed.    Jagruti Wood, APRN  September 2, 2020

## 2020-09-04 LAB
LAB AP CASE REPORT: NORMAL
PATH REPORT.FINAL DX SPEC: NORMAL

## 2020-09-08 ENCOUNTER — TELEPHONE (OUTPATIENT)
Dept: OBSTETRICS AND GYNECOLOGY | Facility: CLINIC | Age: 40
End: 2020-09-08

## 2020-09-08 RX ORDER — DOXYCYCLINE 100 MG/1
100 CAPSULE ORAL 2 TIMES DAILY
Qty: 20 CAPSULE | Refills: 0 | Status: SHIPPED | OUTPATIENT
Start: 2020-09-08 | End: 2020-09-18

## 2020-09-08 NOTE — TELEPHONE ENCOUNTER
Spoke to patient regarding EMB result- benign proliferative endometrium with chronic endometritis. Offered short-term course of abx-doxycyline as chronic endometritis could be an attributing factor to her AUB. Pt agreeable to abx. Discuss that she is due for her Pap with her IUD string check and she also needs a mammo. Attempted to transfer to the  to modify her appt; but no answer from the . Will have the  call patient.

## 2020-10-02 ENCOUNTER — LAB (OUTPATIENT)
Dept: LAB | Facility: HOSPITAL | Age: 40
End: 2020-10-02

## 2020-10-02 ENCOUNTER — OFFICE VISIT (OUTPATIENT)
Dept: FAMILY MEDICINE CLINIC | Facility: CLINIC | Age: 40
End: 2020-10-02

## 2020-10-02 VITALS
HEIGHT: 60 IN | OXYGEN SATURATION: 98 % | HEART RATE: 119 BPM | TEMPERATURE: 97.5 F | WEIGHT: 154.4 LBS | SYSTOLIC BLOOD PRESSURE: 124 MMHG | DIASTOLIC BLOOD PRESSURE: 80 MMHG | BODY MASS INDEX: 30.31 KG/M2

## 2020-10-02 DIAGNOSIS — F32.89 OTHER DEPRESSION: ICD-10-CM

## 2020-10-02 DIAGNOSIS — Z01.818 PREOP GENERAL PHYSICAL EXAM: ICD-10-CM

## 2020-10-02 DIAGNOSIS — M54.32 SCIATICA OF LEFT SIDE: ICD-10-CM

## 2020-10-02 DIAGNOSIS — N92.1 MENORRHAGIA WITH IRREGULAR CYCLE: ICD-10-CM

## 2020-10-02 PROCEDURE — 99213 OFFICE O/P EST LOW 20 MIN: CPT | Performed by: FAMILY MEDICINE

## 2020-10-02 PROCEDURE — 80053 COMPREHEN METABOLIC PANEL: CPT

## 2020-10-02 RX ORDER — CYCLOBENZAPRINE HCL 10 MG
10 TABLET ORAL 3 TIMES DAILY PRN
Qty: 90 TABLET | Refills: 1 | Status: SHIPPED | OUTPATIENT
Start: 2020-10-02 | End: 2021-02-26

## 2020-10-02 RX ORDER — DOXYCYCLINE HYCLATE 100 MG/1
CAPSULE ORAL
COMMUNITY
Start: 2020-09-08 | End: 2020-10-09

## 2020-10-02 RX ORDER — DULOXETIN HYDROCHLORIDE 60 MG/1
60 CAPSULE, DELAYED RELEASE ORAL DAILY
Qty: 30 CAPSULE | Refills: 3 | Status: SHIPPED | OUTPATIENT
Start: 2020-10-02 | End: 2021-02-26

## 2020-10-02 RX ORDER — PREDNISONE 10 MG/1
10 TABLET ORAL SEE ADMIN INSTRUCTIONS
Qty: 17 TABLET | Refills: 0 | Status: SHIPPED | OUTPATIENT
Start: 2020-10-02 | End: 2021-02-26

## 2020-10-02 NOTE — PROGRESS NOTES
Subjective   Viridiana Mota is a 40 y.o. female. Viridiana Mota is a 40 y.o. overweight  white female.G2-P2, H/O Anxiety -Depression( seen at [facility]), Joint pains, Menstrual problems. Presents for exam, to discuss health problems, Tx and F/U plan.    '  Periods better with Mirena. Depression not changed. Have flare of Sciatica L side.  Have plans for [place] teet extraction on Oct 26 needs physical for clearance'.     Fatigue  This is a chronic problem. The current episode started more than 1 month ago. Associated symptoms include arthralgias, fatigue, joint swelling and myalgias. Pertinent negatives include no abdominal pain, chest pain, chills, congestion, coughing, diaphoresis, fever, headaches, nausea, neck pain, numbness, rash, sore throat, vomiting or weakness. The symptoms are aggravated by eating. She has tried nothing for the symptoms. The treatment provided no relief.   Depression  Visit Type: initial  Onset of symptoms: more than 5 years ago  Progression since onset: waxing and waning  Patient presents with the following symptoms: depressed mood and nervousness/anxiety.  Patient is not experiencing: choking sensation, confusion, decreased concentration, palpitations, shortness of breath and suicidal ideas.  Frequency of symptoms: occasionally   Aggravated by: family issues  Sleep quality: fair  Patient has a history of: depression  Treatment tried: SSRI, lifestyle changes and counseling (CBT)  Compliance with treatment: good  Improvement on treatment: moderate      Back Pain  This is a new problem. The current episode started 1 to 4 weeks ago. The problem occurs daily. The problem has been waxing and waning since onset. The pain radiates to the left thigh. The pain is at a severity of 5/10. The pain is moderate. The symptoms are aggravated by standing and sitting. Associated symptoms include leg pain. Pertinent negatives include no abdominal pain, chest pain, dysuria, fever, headaches, numbness, pelvic  pain or weakness. She has tried analgesics for the symptoms. The treatment provided no relief.   Menstrual Problem  This is a recurrent problem. The current episode started more than 1 month ago. The problem occurs daily. The problem has been gradually improving. Associated symptoms include arthralgias, fatigue, joint swelling and myalgias. Pertinent negatives include no abdominal pain, chest pain, chills, congestion, coughing, diaphoresis, fever, headaches, nausea, neck pain, numbness, rash, sore throat, vomiting or weakness. Associated symptoms comments: Excessive periods. Treatments tried: Mirena IUD. The treatment provided significant relief.        The following portions of the patient's history were reviewed and updated as appropriate: allergies, current medications, past family history, past medical history, past social history, past surgical history and problem list.    Review of Systems   Constitutional: Positive for fatigue. Negative for activity change, appetite change, chills, diaphoresis, fever and unexpected weight change.   HENT: Negative for congestion, dental problem, drooling, ear discharge, ear pain, facial swelling, hearing loss, mouth sores, nosebleeds, postnasal drip, rhinorrhea, sinus pressure, sinus pain, sneezing, sore throat, tinnitus, trouble swallowing and voice change.    Eyes: Negative for photophobia, pain, discharge, redness, itching and visual disturbance.        Eye exam 1 yr, OK  Astigmatism.   Respiratory: Negative for apnea, cough, choking, chest tightness, shortness of breath, wheezing and stridor.         1 cig/day   Cardiovascular: Negative for chest pain, palpitations and leg swelling.   Gastrointestinal: Negative for abdominal distention, abdominal pain, anal bleeding, blood in stool, constipation, diarrhea, nausea, rectal pain and vomiting.   Endocrine: Negative for cold intolerance, heat intolerance, polydipsia, polyphagia and polyuria.   Genitourinary: Positive for  enuresis and menstrual problem. Negative for decreased urine volume, difficulty urinating, dyspareunia, dysuria, flank pain, frequency, genital sores, hematuria, pelvic pain, urgency, vaginal bleeding, vaginal discharge and vaginal pain.        Heavy bleeding resolved after Mirena   Musculoskeletal: Positive for arthralgias, back pain, joint swelling and myalgias. Negative for gait problem, neck pain and neck stiffness.        Low back pain down L buttock   Skin: Negative for color change, pallor, rash and wound.   Allergic/Immunologic: Negative for environmental allergies, food allergies and immunocompromised state.   Neurological: Negative for dizziness, tremors, seizures, syncope, facial asymmetry, speech difficulty, weakness, light-headedness, numbness and headaches.   Hematological: Negative for adenopathy. Does not bruise/bleed easily.   Psychiatric/Behavioral: Positive for dysphoric mood and sleep disturbance. Negative for agitation, behavioral problems, confusion, decreased concentration, hallucinations, self-injury and suicidal ideas. The patient is nervous/anxious. The patient is not hyperactive.          Sleeps usually 4-6hrs.       Objective   Physical Exam  Vitals signs and nursing note reviewed.   Constitutional:       Appearance: Normal appearance. She is well-developed. She is obese.   HENT:      Head: Normocephalic and atraumatic.      Right Ear: Tympanic membrane, ear canal and external ear normal.      Left Ear: Tympanic membrane, ear canal and external ear normal.      Nose: Nose normal.      Mouth/Throat:      Mouth: Mucous membranes are moist.      Pharynx: Oropharynx is clear.   Eyes:      Extraocular Movements: Extraocular movements intact.      Conjunctiva/sclera: Conjunctivae normal.      Pupils: Pupils are equal, round, and reactive to light.   Neck:      Musculoskeletal: Normal range of motion and neck supple.   Cardiovascular:      Rate and Rhythm: Normal rate and regular rhythm.       Heart sounds: Normal heart sounds.   Pulmonary:      Effort: Pulmonary effort is normal.      Breath sounds: Normal breath sounds.   Abdominal:      General: Bowel sounds are normal. There is no distension.      Palpations: Abdomen is soft.      Tenderness: There is no abdominal tenderness.   Musculoskeletal: Normal range of motion.   Skin:     General: Skin is warm and dry.   Neurological:      Mental Status: She is alert and oriented to person, place, and time.   Psychiatric:         Mood and Affect: Mood normal.         Speech: Speech normal.         Behavior: Behavior normal.         Thought Content: Thought content normal.         Judgment: Judgment normal.         Assessment/Plan   Viridiana was seen today for fatigue, depression and back pain.    Diagnoses and all orders for this visit:    Other depression  Comments:  Cymbalta  Orders:  -     Ambulatory Referral to Psychology    Sciatica of left side  -     Cancel: Cyclobenzaprine (Flexeril); Future  -     Ambulatory Referral to Physical Therapy Evaluate and treat    Preop general physical exam  -     Comprehensive metabolic panel; Future    Menorrhagia with irregular cycle  Comments:  Improved with Mirena    Other orders  -     DULoxetine (Cymbalta) 60 MG capsule; Take 1 capsule by mouth Daily.  -     predniSONE (DELTASONE) 10 MG tablet; Take 1 tablet by mouth See Admin Instructions. Take 1 Tab Tid x3 days, Then 1 Tab Bid x 2 days Then 1 Tab QD x4  days,then D/C  -     cyclobenzaprine (FLEXERIL) 10 MG tablet; Take 1 tablet by mouth 3 (Three) Times a Day As Needed for Muscle Spasms.    Discussed exam, health problems, meds, indications, Tx plan, checking routine labs. OK from PCP standpoint to have [place] teeth extraction. Discussed F/U plan.        This document has been electronically signed by Lane Rod MD

## 2020-10-03 LAB
ALBUMIN SERPL-MCNC: 4.4 G/DL (ref 3.5–5.2)
ALBUMIN/GLOB SERPL: 1.6 G/DL
ALP SERPL-CCNC: 71 U/L (ref 39–117)
ALT SERPL W P-5'-P-CCNC: 9 U/L (ref 1–33)
ANION GAP SERPL CALCULATED.3IONS-SCNC: 9.1 MMOL/L (ref 5–15)
AST SERPL-CCNC: 14 U/L (ref 1–32)
BILIRUB SERPL-MCNC: <0.2 MG/DL (ref 0–1.2)
BUN SERPL-MCNC: 9 MG/DL (ref 6–20)
BUN/CREAT SERPL: 12.2 (ref 7–25)
CALCIUM SPEC-SCNC: 9.5 MG/DL (ref 8.6–10.5)
CHLORIDE SERPL-SCNC: 103 MMOL/L (ref 98–107)
CO2 SERPL-SCNC: 27.9 MMOL/L (ref 22–29)
CREAT SERPL-MCNC: 0.74 MG/DL (ref 0.57–1)
GFR SERPL CREATININE-BSD FRML MDRD: 87 ML/MIN/1.73
GLOBULIN UR ELPH-MCNC: 2.7 GM/DL
GLUCOSE SERPL-MCNC: 98 MG/DL (ref 65–99)
POTASSIUM SERPL-SCNC: 4.4 MMOL/L (ref 3.5–5.2)
PROT SERPL-MCNC: 7.1 G/DL (ref 6–8.5)
SODIUM SERPL-SCNC: 140 MMOL/L (ref 136–145)

## 2020-10-07 ENCOUNTER — TELEPHONE (OUTPATIENT)
Dept: FAMILY MEDICINE CLINIC | Facility: CLINIC | Age: 40
End: 2020-10-07

## 2020-10-16 ENCOUNTER — NURSE TRIAGE (OUTPATIENT)
Dept: CALL CENTER | Facility: HOSPITAL | Age: 40
End: 2020-10-16

## 2020-10-17 NOTE — TELEPHONE ENCOUNTER
"Reviewed guideline with caller, advises she be seen in ED for evaluation. Caller agrees to follow care advice.     Reason for Disposition  • [1] SEVERE pain (e.g., excruciating) AND [2] present > 1 hour    Additional Information  • Negative: Shock suspected (e.g., cold/pale/clammy skin, too weak to stand, low BP, rapid pulse)  • Negative: Difficult to awaken or acting confused (e.g., disoriented, slurred speech)  • Negative: Passed out (i.e., lost consciousness, collapsed and was not responding)  • Negative: Sounds like a life-threatening emergency to the triager  • Negative: Chest pain  • Negative: Pain is mainly in upper abdomen  (if needed ask: \"is it mainly above the belly button?\")  • Negative: Followed an abdomen (stomach) injury  • Negative: [1] Abdominal pain AND [2] pregnant < 20 weeks  • Negative: [1] Abdominal pain AND [2] pregnant > 20 weeks  • Negative: [1] Abdominal pain AND [2] postpartum (from 0 to 6 weeks after delivery)    Answer Assessment - Initial Assessment Questions  1. LOCATION: \"Where does it hurt?\"       Lower abdominal  2. RADIATION: \"Does the pain shoot anywhere else?\" (e.g., chest, back)      Left quadrant and cervix and back   3. ONSET: \"When did the pain begin?\" (e.g., minutes, hours or days ago)       2 hours ago   4. SUDDEN: \"Gradual or sudden onset?\"      Gradual more intense as time as gone on  5. PATTERN \"Does the pain come and go, or is it constant?\"     - If constant: \"Is it getting better, staying the same, or worsening?\"       (Note: Constant means the pain never goes away completely; most serious pain is constant and it progresses)      - If intermittent: \"How long does it last?\" \"Do you have pain now?\"      (Note: Intermittent means the pain goes away completely between bouts)      Constant, getting worse   6. SEVERITY: \"How bad is the pain?\"  (e.g., Scale 1-10; mild, moderate, or severe)    - MILD (1-3): doesn't interfere with normal activities, abdomen soft and not tender " "to touch     - MODERATE (4-7): interferes with normal activities or awakens from sleep, tender to touch     - SEVERE (8-10): excruciating pain, doubled over, unable to do any normal activities       9/10  7. RECURRENT SYMPTOM: \"Have you ever had this type of abdominal pain before?\" If so, ask: \"When was the last time?\" and \"What happened that time?\"       labor  8. CAUSE: \"What do you think is causing the abdominal pain?\"      Unknown   9. RELIEVING/AGGRAVATING FACTORS: \"What makes it better or worse?\" (e.g., movement, antacids, bowel movement)      Movement makes it worse  10. OTHER SYMPTOMS: \"Has there been any vomiting, diarrhea, constipation, or urine problems?\"        no  11. PREGNANCY: \"Is there any chance you are pregnant?\" \"When was your last menstrual period?\"        no    Protocols used: ABDOMINAL PAIN - FEMALE-ADULT-AH      "

## 2020-11-10 ENCOUNTER — PATIENT MESSAGE (OUTPATIENT)
Dept: FAMILY MEDICINE CLINIC | Facility: CLINIC | Age: 40
End: 2020-11-10

## 2021-02-26 ENCOUNTER — OFFICE VISIT (OUTPATIENT)
Dept: FAMILY MEDICINE CLINIC | Facility: CLINIC | Age: 41
End: 2021-02-26

## 2021-02-26 VITALS
TEMPERATURE: 97.8 F | OXYGEN SATURATION: 97 % | HEART RATE: 103 BPM | SYSTOLIC BLOOD PRESSURE: 110 MMHG | DIASTOLIC BLOOD PRESSURE: 70 MMHG | WEIGHT: 156 LBS | HEIGHT: 60 IN | BODY MASS INDEX: 30.63 KG/M2

## 2021-02-26 DIAGNOSIS — N92.1 MENORRHAGIA WITH IRREGULAR CYCLE: ICD-10-CM

## 2021-02-26 DIAGNOSIS — F32.A DEPRESSION, UNSPECIFIED DEPRESSION TYPE: ICD-10-CM

## 2021-02-26 DIAGNOSIS — R30.0 DYSURIA: ICD-10-CM

## 2021-02-26 DIAGNOSIS — R10.30 LOWER ABDOMINAL PAIN: Primary | ICD-10-CM

## 2021-02-26 LAB
BILIRUB BLD-MCNC: NEGATIVE MG/DL
CLARITY, POC: ABNORMAL
COLOR UR: YELLOW
GLUCOSE UR STRIP-MCNC: NEGATIVE MG/DL
KETONES UR QL: NEGATIVE
LEUKOCYTE EST, POC: NEGATIVE
NITRITE UR-MCNC: NEGATIVE MG/ML
PH UR: 8 [PH] (ref 5–8)
PROT UR STRIP-MCNC: NEGATIVE MG/DL
RBC # UR STRIP: NEGATIVE /UL
SP GR UR: 1.01 (ref 1–1.03)
UROBILINOGEN UR QL: NORMAL

## 2021-02-26 PROCEDURE — 99214 OFFICE O/P EST MOD 30 MIN: CPT | Performed by: FAMILY MEDICINE

## 2021-02-26 RX ORDER — SULFAMETHOXAZOLE AND TRIMETHOPRIM 800; 160 MG/1; MG/1
1 TABLET ORAL 2 TIMES DAILY
Qty: 10 TABLET | Refills: 0 | Status: SHIPPED | OUTPATIENT
Start: 2021-02-26 | End: 2021-03-19

## 2021-02-26 RX ORDER — VENLAFAXINE HYDROCHLORIDE 37.5 MG/1
37.5 CAPSULE, EXTENDED RELEASE ORAL DAILY
Qty: 30 CAPSULE | Refills: 2 | Status: SHIPPED | OUTPATIENT
Start: 2021-02-26 | End: 2021-03-19

## 2021-02-26 RX ORDER — METRONIDAZOLE 500 MG/1
500 TABLET ORAL 3 TIMES DAILY
Qty: 15 TABLET | Refills: 0 | Status: SHIPPED | OUTPATIENT
Start: 2021-02-26 | End: 2021-03-19

## 2021-02-26 NOTE — PROGRESS NOTES
Chief Complaint  Urinary Incontinence, Depression, and Abdominal Pain (Feel like something is wrong with IUD)    change to    Subjective          Viridiana Mota presents to [facility]  Depression  Visit Type: initial  Onset of symptoms: more than 5 years ago  Progression since onset: waxing and waning  Patient presents with the following symptoms: depressed mood and nervousness/anxiety.  Patient is not experiencing: choking sensation, confusion, decreased concentration, palpitations, shortness of breath and suicidal ideas.  Frequency of symptoms: occasionally   Patient has a history of: depression  Treatment tried: SSRI, lifestyle changes and counseling (CBT)  Compliance with treatment: good  Improvement on treatment: mild (Would like change to Effexor)      Fatigue  This is a chronic problem. The current episode started more than 1 month ago. Associated symptoms include arthralgias, fatigue, joint swelling and myalgias. Pertinent negatives include no abdominal pain, chest pain, chills, congestion, coughing, diaphoresis, fever, headaches, nausea, neck pain, numbness, rash, sore throat, vomiting or weakness. The symptoms are aggravated by eating. She has tried nothing for the symptoms. The treatment provided no relief.   Menstrual Problem  This is a recurrent problem. The current episode started more than 1 month ago. The problem occurs daily. The problem has been gradually improving. Associated symptoms include arthralgias, fatigue, joint swelling and myalgias. Pertinent negatives include no abdominal pain, chest pain, chills, congestion, coughing, diaphoresis, fever, headaches, nausea, neck pain, numbness, rash, sore throat, vomiting or weakness. Associated symptoms comments: Excessive periods. Treatments tried: IUD helped periods, but now having some pain. The treatment provided moderate relief.   Abdominal Pain  This is a new problem. The current episode started 1 to 4 weeks ago.  "The problem occurs intermittently. The quality of the pain is aching and cramping. The abdominal pain radiates to the suprapubic region and pelvis. Associated symptoms include arthralgias and myalgias. Pertinent negatives include no fever, headaches, nausea or vomiting. She has tried acetaminophen for the symptoms. The treatment provided no relief. IUD       Review of Systems   Constitutional: Positive for fatigue. Negative for chills, diaphoresis and fever.   HENT: Negative for congestion and sore throat.    Eyes: Negative.    Respiratory: Negative for cough, choking and shortness of breath.    Cardiovascular: Negative for chest pain and palpitations.   Gastrointestinal: Negative for abdominal pain, nausea and vomiting.   Endocrine: Negative.    Genitourinary: Positive for menstrual problem.   Musculoskeletal: Positive for arthralgias, joint swelling and myalgias. Negative for neck pain.   Skin: Negative for rash.   Allergic/Immunologic: Negative.    Neurological: Negative for weakness, numbness and confusion.   Hematological: Negative.    Psychiatric/Behavioral: Positive for depressed mood. Negative for decreased concentration and suicidal ideas. The patient is nervous/anxious.      Objective   Vital Signs:   /70 (BP Location: Right arm, Patient Position: Sitting, Cuff Size: Adult)   Pulse 103   Temp 97.8 °F (36.6 °C) (Temporal)   Ht 152.4 cm (60\")   Wt 70.8 kg (156 lb)   SpO2 97%   BMI 30.47 kg/m²     Physical Exam  Vitals signs and nursing note reviewed.   Constitutional:       Appearance: Normal appearance. She is well-developed.   HENT:      Head: Normocephalic and atraumatic.      Right Ear: Tympanic membrane, ear canal and external ear normal.      Left Ear: Tympanic membrane, ear canal and external ear normal.      Nose: Nose normal.      Mouth/Throat:      Mouth: Mucous membranes are moist.      Pharynx: Oropharynx is clear.   Eyes:      Extraocular Movements: Extraocular movements intact.      " Conjunctiva/sclera: Conjunctivae normal.      Pupils: Pupils are equal, round, and reactive to light.   Neck:      Musculoskeletal: Normal range of motion and neck supple.   Cardiovascular:      Rate and Rhythm: Normal rate and regular rhythm.      Heart sounds: Normal heart sounds.   Pulmonary:      Effort: Pulmonary effort is normal.      Breath sounds: Normal breath sounds.   Abdominal:      General: Bowel sounds are normal. There is no distension.      Palpations: Abdomen is soft.      Tenderness: There is abdominal tenderness.      Comments: RLQ   Musculoskeletal: Normal range of motion.   Skin:     General: Skin is warm and dry.   Neurological:      Mental Status: She is alert and oriented to person, place, and time.   Psychiatric:         Mood and Affect: Mood normal.         Speech: Speech normal.         Behavior: Behavior normal.         Thought Content: Thought content normal.         Judgment: Judgment normal.        Result Review :                 Assessment and Plan    Diagnoses and all orders for this visit:    1. Lower abdominal pain (Primary)  Comments:  Feel like something wrong with IUD  Orders:  -     CBC & Differential; Future  -     Comprehensive metabolic panel; Future  -     TSH; Future  -     Lipid Panel; Future  -     sulfamethoxazole-trimethoprim (BACTRIM DS,SEPTRA DS) 800-160 MG per tablet; Take 1 tablet by mouth 2 (Two) Times a Day.  Dispense: 10 tablet; Refill: 0  -     metroNIDAZOLE (Flagyl) 500 MG tablet; Take 1 tablet by mouth 3 (Three) Times a Day.  Dispense: 15 tablet; Refill: 0    2. Dysuria  -     POC Urinalysis Dipstick, Automated    3. Depression, unspecified depression type  -     venlafaxine XR (Effexor XR) 37.5 MG 24 hr capsule; Take 1 capsule by mouth Daily.  Dispense: 30 capsule; Refill: 2    4. Menorrhagia with irregular cycle  Comments:  Improved with IUD        Follow Up   Return in about 2 weeks (around 3/12/2021).  Patient was given instructions and counseling  regarding her condition or for health maintenance advice. Please see specific information pulled into the AVS if appropriate.   Discussed exam, healthcare problems, medications indications treatment plan rationale.  Discussed POC urinalysis clear.  Concern for possible PID.  Trial of antibiotics.  Discussed follow-up with OB/GYN who placed IUD.  Discussed checking labs.  Discussed follow-up here.        This document has been electronically signed by aLne Rod MD

## 2021-03-19 ENCOUNTER — OFFICE VISIT (OUTPATIENT)
Dept: FAMILY MEDICINE CLINIC | Facility: CLINIC | Age: 41
End: 2021-03-19

## 2021-03-19 ENCOUNTER — LAB (OUTPATIENT)
Dept: LAB | Facility: HOSPITAL | Age: 41
End: 2021-03-19

## 2021-03-19 VITALS
HEART RATE: 99 BPM | DIASTOLIC BLOOD PRESSURE: 80 MMHG | TEMPERATURE: 97.5 F | WEIGHT: 152.3 LBS | BODY MASS INDEX: 29.9 KG/M2 | HEIGHT: 60 IN | SYSTOLIC BLOOD PRESSURE: 110 MMHG | OXYGEN SATURATION: 97 %

## 2021-03-19 DIAGNOSIS — F32.A DEPRESSION, UNSPECIFIED DEPRESSION TYPE: ICD-10-CM

## 2021-03-19 DIAGNOSIS — R53.83 FATIGUE, UNSPECIFIED TYPE: ICD-10-CM

## 2021-03-19 DIAGNOSIS — R10.30 LOWER ABDOMINAL PAIN: ICD-10-CM

## 2021-03-19 DIAGNOSIS — M19.90 ARTHRITIS: ICD-10-CM

## 2021-03-19 DIAGNOSIS — M25.50 ARTHRALGIA, UNSPECIFIED JOINT: ICD-10-CM

## 2021-03-19 DIAGNOSIS — N92.1 MENORRHAGIA WITH IRREGULAR CYCLE: ICD-10-CM

## 2021-03-19 LAB — ERYTHROCYTE [SEDIMENTATION RATE] IN BLOOD: 25 MM/HR (ref 0–20)

## 2021-03-19 PROCEDURE — 84443 ASSAY THYROID STIM HORMONE: CPT

## 2021-03-19 PROCEDURE — 85025 COMPLETE CBC W/AUTO DIFF WBC: CPT

## 2021-03-19 PROCEDURE — 80061 LIPID PANEL: CPT

## 2021-03-19 PROCEDURE — 85651 RBC SED RATE NONAUTOMATED: CPT

## 2021-03-19 PROCEDURE — 80053 COMPREHEN METABOLIC PANEL: CPT

## 2021-03-19 PROCEDURE — 99214 OFFICE O/P EST MOD 30 MIN: CPT | Performed by: FAMILY MEDICINE

## 2021-03-19 PROCEDURE — 85007 BL SMEAR W/DIFF WBC COUNT: CPT

## 2021-03-19 RX ORDER — PREDNISONE 10 MG/1
10 TABLET ORAL SEE ADMIN INSTRUCTIONS
Qty: 17 TABLET | Refills: 0 | Status: SHIPPED | OUTPATIENT
Start: 2021-03-19 | End: 2021-04-05

## 2021-03-19 RX ORDER — VENLAFAXINE HYDROCHLORIDE 75 MG/1
75 CAPSULE, EXTENDED RELEASE ORAL DAILY
Qty: 30 CAPSULE | Refills: 2 | Status: SHIPPED | OUTPATIENT
Start: 2021-03-19 | End: 2021-04-07 | Stop reason: SDUPTHER

## 2021-03-19 NOTE — PROGRESS NOTES
Chief Complaint  Depression (would like to discuss increasing medication), Abdominal Pain, Hand Pain (left), Foot Pain (left), Knee Pain (left), and Elbow Pain (right)    Subjective          Viridiana Mota presents to [facility] for     Depression  Visit Type: initial  Onset of symptoms: more than 5 years ago  Progression since onset: waxing and waning  Patient presents with the following symptoms: depressed mood and nervousness/anxiety.  Patient is not experiencing: choking sensation, confusion, decreased concentration, palpitations, shortness of breath and suicidal ideas.  Frequency of symptoms: occasionally   Patient has a history of: depression  Treatment tried: SSRI, lifestyle changes and counseling (CBT)  Compliance with treatment: good  Improvement on treatment: mild (Would like change to Effexor)      Fatigue  This is a chronic problem. The current episode started more than 1 month ago. Associated symptoms include arthralgias, fatigue, joint swelling and myalgias. Pertinent negatives include no abdominal pain, chest pain, chills, congestion, coughing, diaphoresis, fever, headaches, nausea, neck pain, numbness, rash, sore throat, vomiting or weakness. The symptoms are aggravated by eating. She has tried nothing for the symptoms. The treatment provided no relief.   Menstrual Problem  This is a recurrent problem. The current episode started more than 1 month ago. The problem occurs daily. The problem has been waxing and waning. Associated symptoms include arthralgias, fatigue, joint swelling and myalgias. Pertinent negatives include no abdominal pain, chest pain, chills, congestion, coughing, diaphoresis, fever, headaches, nausea, neck pain, numbness, rash, sore throat, vomiting or weakness. Associated symptoms comments: Excessive periods. Treatments tried: IUD helped periods, but now having some pain. The treatment provided moderate relief.   Abdominal Pain  This is a new  "problem. The current episode started more than 1 month ago. The problem occurs intermittently. The pain is at a severity of 4/10. The quality of the pain is aching and cramping. The abdominal pain radiates to the suprapubic region and pelvis. Associated symptoms include arthralgias and myalgias. Pertinent negatives include no fever, headaches, nausea or vomiting. She has tried acetaminophen for the symptoms. The treatment provided no relief. IUD       Objective   Vital Signs:   /80 (BP Location: Right arm, Patient Position: Sitting, Cuff Size: Adult)   Pulse 99   Temp 97.5 °F (36.4 °C) (Temporal)   Ht 152.4 cm (60\")   Wt 69.1 kg (152 lb 4.8 oz)   SpO2 97%   BMI 29.74 kg/m²     Physical Exam  Vitals and nursing note reviewed.   Constitutional:       Appearance: Normal appearance. She is well-developed.   HENT:      Head: Normocephalic and atraumatic.      Right Ear: Tympanic membrane, ear canal and external ear normal.      Left Ear: Tympanic membrane, ear canal and external ear normal.      Nose: Nose normal.      Mouth/Throat:      Mouth: Mucous membranes are moist.      Pharynx: Oropharynx is clear.   Eyes:      Extraocular Movements: Extraocular movements intact.      Conjunctiva/sclera: Conjunctivae normal.      Pupils: Pupils are equal, round, and reactive to light.   Cardiovascular:      Rate and Rhythm: Normal rate and regular rhythm.      Heart sounds: Normal heart sounds.   Pulmonary:      Effort: Pulmonary effort is normal.      Breath sounds: Normal breath sounds.   Abdominal:      General: Bowel sounds are normal. There is no distension.      Palpations: Abdomen is soft.      Tenderness: There is abdominal tenderness.      Comments: RLQ   Musculoskeletal:         General: Normal range of motion.      Cervical back: Normal range of motion and neck supple.   Skin:     General: Skin is warm and dry.   Neurological:      Mental Status: She is alert and oriented to person, place, and time.   "   Psychiatric:         Mood and Affect: Mood normal.         Speech: Speech normal.         Behavior: Behavior normal.         Thought Content: Thought content normal.         Judgment: Judgment normal.        Result Review :                 Assessment and Plan    Diagnoses and all orders for this visit:    1. Arthralgia, unspecified joint  -     Sedimentation rate, automated; Future  -     predniSONE (DELTASONE) 10 MG tablet; Take 1 tablet by mouth See Admin Instructions. Take 1 Tab Tid x3 days, Then 1 Tab Bid x 2 days Then 1 Tab QD x4 days,then D/C  Dispense: 17 tablet; Refill: 0    2. Depression, unspecified depression type  -     venlafaxine XR (Effexor XR) 75 MG 24 hr capsule; Take 1 capsule by mouth Daily.  Dispense: 30 capsule; Refill: 2    3. Arthritis    4. Menorrhagia with irregular cycle    5. Fatigue, unspecified type    Discussed exam, health problems, medications, indications treatment plan rationale.  Discussed has appointment with his health.  Discussed trial of steroids for flare of arthralgia.  Discussed increasing Effexor to 75 mg.  Discussed CBT, counseling, follow-up plan.    Follow Up   Return in about 2 weeks (around 4/2/2021).  Patient was given instructions and counseling regarding her condition or for health maintenance advice. Please see specific information pulled into the AVS if appropriate.         This document has been electronically signed by Lane Rod MD

## 2021-03-20 LAB
ALBUMIN SERPL-MCNC: 4.8 G/DL (ref 3.5–5.2)
ALBUMIN/GLOB SERPL: 1.5 G/DL
ALP SERPL-CCNC: 79 U/L (ref 39–117)
ALT SERPL W P-5'-P-CCNC: 9 U/L (ref 1–33)
ANION GAP SERPL CALCULATED.3IONS-SCNC: 10.8 MMOL/L (ref 5–15)
AST SERPL-CCNC: 19 U/L (ref 1–32)
BILIRUB SERPL-MCNC: 0.2 MG/DL (ref 0–1.2)
BUN SERPL-MCNC: 15 MG/DL (ref 6–20)
BUN/CREAT SERPL: 18.5 (ref 7–25)
CALCIUM SPEC-SCNC: 9.6 MG/DL (ref 8.6–10.5)
CHLORIDE SERPL-SCNC: 103 MMOL/L (ref 98–107)
CHOLEST SERPL-MCNC: 218 MG/DL (ref 0–200)
CO2 SERPL-SCNC: 23.2 MMOL/L (ref 22–29)
CREAT SERPL-MCNC: 0.81 MG/DL (ref 0.57–1)
DEPRECATED RDW RBC AUTO: 44.8 FL (ref 37–54)
EOSINOPHIL # BLD MANUAL: 0.1 10*3/MM3 (ref 0–0.4)
EOSINOPHIL NFR BLD MANUAL: 1 % (ref 0.3–6.2)
ERYTHROCYTE [DISTWIDTH] IN BLOOD BY AUTOMATED COUNT: 18.6 % (ref 12.3–15.4)
GFR SERPL CREATININE-BSD FRML MDRD: 78 ML/MIN/1.73
GLOBULIN UR ELPH-MCNC: 3.2 GM/DL
GLUCOSE SERPL-MCNC: 84 MG/DL (ref 65–99)
HCT VFR BLD AUTO: 34.7 % (ref 34–46.6)
HDLC SERPL-MCNC: 78 MG/DL (ref 40–60)
HGB BLD-MCNC: 9.8 G/DL (ref 12–15.9)
LDLC SERPL CALC-MCNC: 122 MG/DL (ref 0–100)
LDLC/HDLC SERPL: 1.53 {RATIO}
LYMPHOCYTES # BLD MANUAL: 2.36 10*3/MM3 (ref 0.7–3.1)
LYMPHOCYTES NFR BLD MANUAL: 23.7 % (ref 19.6–45.3)
LYMPHOCYTES NFR BLD MANUAL: 6.2 % (ref 5–12)
MCH RBC QN AUTO: 19.6 PG (ref 26.6–33)
MCHC RBC AUTO-ENTMCNC: 28.2 G/DL (ref 31.5–35.7)
MCV RBC AUTO: 69.3 FL (ref 79–97)
MONOCYTES # BLD AUTO: 0.62 10*3/MM3 (ref 0.1–0.9)
NEUTROPHILS # BLD AUTO: 6.89 10*3/MM3 (ref 1.7–7)
NEUTROPHILS NFR BLD MANUAL: 69.1 % (ref 42.7–76)
NRBC BLD AUTO-RTO: 0 /100 WBC (ref 0–0.2)
PLAT MORPH BLD: NORMAL
PLATELET # BLD AUTO: 499 10*3/MM3 (ref 140–450)
PMV BLD AUTO: 9.9 FL (ref 6–12)
POTASSIUM SERPL-SCNC: 4.6 MMOL/L (ref 3.5–5.2)
PROT SERPL-MCNC: 8 G/DL (ref 6–8.5)
RBC # BLD AUTO: 5.01 10*6/MM3 (ref 3.77–5.28)
RBC MORPH BLD: NORMAL
SODIUM SERPL-SCNC: 137 MMOL/L (ref 136–145)
TRIGL SERPL-MCNC: 103 MG/DL (ref 0–150)
TSH SERPL DL<=0.05 MIU/L-ACNC: 1.8 UIU/ML (ref 0.27–4.2)
VLDLC SERPL-MCNC: 18 MG/DL (ref 5–40)
WBC # BLD AUTO: 9.97 10*3/MM3 (ref 3.4–10.8)
WBC MORPH BLD: NORMAL

## 2021-03-22 ENCOUNTER — TELEPHONE (OUTPATIENT)
Dept: OBSTETRICS AND GYNECOLOGY | Facility: CLINIC | Age: 41
End: 2021-03-22

## 2021-03-23 ENCOUNTER — TELEPHONE (OUTPATIENT)
Dept: FAMILY MEDICINE CLINIC | Facility: CLINIC | Age: 41
End: 2021-03-23

## 2021-03-23 NOTE — TELEPHONE ENCOUNTER
----- Message from Lane Rod MD sent at 3/21/2021  9:25 AM CDT -----  Anemia is significant, make sure to let [facility] know, need to be seen even though bleeding not stopped. Make sure to take iron supplement. Will go over lab at next visit.

## 2021-03-31 ENCOUNTER — OFFICE VISIT (OUTPATIENT)
Dept: OBSTETRICS AND GYNECOLOGY | Facility: CLINIC | Age: 41
End: 2021-03-31

## 2021-03-31 VITALS
SYSTOLIC BLOOD PRESSURE: 108 MMHG | DIASTOLIC BLOOD PRESSURE: 64 MMHG | HEIGHT: 60 IN | BODY MASS INDEX: 30.04 KG/M2 | WEIGHT: 153 LBS

## 2021-03-31 DIAGNOSIS — D64.9 ANEMIA, UNSPECIFIED TYPE: ICD-10-CM

## 2021-03-31 DIAGNOSIS — D25.9 UTERINE LEIOMYOMA, UNSPECIFIED LOCATION: ICD-10-CM

## 2021-03-31 DIAGNOSIS — Z01.419 WELL WOMAN EXAM WITH ROUTINE GYNECOLOGICAL EXAM: Primary | ICD-10-CM

## 2021-03-31 DIAGNOSIS — N39.3 STRESS INCONTINENCE: ICD-10-CM

## 2021-03-31 DIAGNOSIS — N92.0 SPOTTING: ICD-10-CM

## 2021-03-31 DIAGNOSIS — Z12.4 ENCOUNTER FOR PAPANICOLAOU SMEAR FOR CERVICAL CANCER SCREENING: ICD-10-CM

## 2021-03-31 PROCEDURE — 99396 PREV VISIT EST AGE 40-64: CPT | Performed by: NURSE PRACTITIONER

## 2021-03-31 RX ORDER — FERROUS SULFATE 325(65) MG
TABLET ORAL
Qty: 60 TABLET | Refills: 10 | Status: SHIPPED | OUTPATIENT
Start: 2021-03-31 | End: 2021-04-08

## 2021-03-31 NOTE — PROGRESS NOTES
Subjective   Viridiana Mota is a 41 y.o. here for gyn annual    Viridiana Mota is a 41 yr old  who presents today for her gyn annual. She reports the Mirena has helped with the amount of bleeding she has from her period but notes that she has been spotting for 2 months almost every day. She also reports some pelvic tenderness. Previous ultrasound done 2020 indicated a 2.5 cm probable fibroid. Recent Hgb 9.8 on 3/19; pt denies taking iron supplements. Is not sexually active. Also c/o of stress incontinence and notes this has been an issue since getting the Mirena. Pt likes to jog and she is unable to do this because of the incontinence.     Gynecologic Exam  The patient's primary symptoms include vaginal bleeding. The patient's pertinent negatives include no genital itching, genital lesions, genital odor, genital rash, missed menses, pelvic pain or vaginal discharge. This is a new problem. The current episode started more than 1 month ago. The problem occurs daily. The problem has been unchanged. The pain is mild. Pertinent negatives include no abdominal pain, chills, constipation, diarrhea, dysuria, fever, frequency, nausea, rash or sore throat. The vaginal bleeding is spotting. She has not been passing clots. She has not been passing tissue. Nothing aggravates the symptoms. She has tried nothing for the symptoms. She is not sexually active. She uses an IUD for contraception. Her past medical history is significant for menorrhagia.       The following portions of the patient's history were reviewed and updated as appropriate: allergies, current medications, past family history, past medical history, past social history, past surgical history and problem list.    Review of Systems   Constitutional: Negative for chills, fatigue, fever, unexpected weight gain and unexpected weight loss.   HENT: Negative for sneezing and sore throat.    Respiratory: Negative for shortness of breath.    Cardiovascular: Negative  for chest pain and palpitations.   Gastrointestinal: Negative for abdominal pain, constipation, diarrhea and nausea.   Endocrine: Negative for cold intolerance and heat intolerance.   Genitourinary: Positive for menorrhagia, urinary incontinence and vaginal bleeding. Negative for breast discharge, breast lump, breast pain, difficulty urinating, dysuria, frequency, menstrual problem, missed menses, pelvic pain, pelvic pressure, vaginal discharge and vaginal pain.        Spotting every day for 2 months   Skin: Negative for rash.   Neurological: Negative for weakness and headache.   Psychiatric/Behavioral: Negative for sleep disturbance, depressed mood and stress.       Objective   Physical Exam  Vitals and nursing note reviewed.   Constitutional:       Appearance: She is well-developed.   HENT:      Head: Normocephalic and atraumatic.   Eyes:      Conjunctiva/sclera: Conjunctivae normal.   Neck:      Thyroid: No thyromegaly.   Cardiovascular:      Rate and Rhythm: Normal rate and regular rhythm.      Heart sounds: Normal heart sounds.   Pulmonary:      Effort: Pulmonary effort is normal. No respiratory distress.      Breath sounds: Normal breath sounds.   Chest:      Breasts:         Right: No swelling, bleeding, inverted nipple, mass, nipple discharge, skin change or tenderness.         Left: No swelling, bleeding, inverted nipple, mass, nipple discharge, skin change or tenderness.   Abdominal:      General: Bowel sounds are normal. There is no distension.      Palpations: Abdomen is soft.      Tenderness: There is no abdominal tenderness.   Genitourinary:     Labia:         Right: No rash, tenderness, lesion or injury.         Left: No rash, tenderness, lesion or injury.       Vagina: No signs of injury and foreign body. No vaginal discharge, erythema, tenderness, bleeding, lesions or prolapsed vaginal walls.      Cervix: Lesion and cervical bleeding present. No cervical motion tenderness, discharge, friability,  erythema or eversion.      Uterus: Normal.       Rectum: No external hemorrhoid.      Comments: Approximately, 2cm growth protruding from cervix.  IUD string visualized.     Pap cotesting collected.   Musculoskeletal:         General: Normal range of motion.      Cervical back: Normal range of motion and neck supple.   Skin:     General: Skin is warm and dry.   Neurological:      Mental Status: She is alert and oriented to person, place, and time.   Psychiatric:         Behavior: Behavior normal.           Assessment/Plan   Diagnoses and all orders for this visit:    1. Well woman exam with routine gynecological exam (Primary)    2. Encounter for Papanicolaou smear for cervical cancer screening  -     Liquid-based Pap Smear, Screening    3. Uterine leiomyoma, unspecified location  -     US Non-ob Transvaginal; Future    4. Spotting    5. Stress incontinence  -     Ambulatory Referral to Physical Therapy    6. Anemia, unspecified type    Other orders  -     ferrous sulfate 325 (65 FE) MG tablet; Take one pill twice a day every other day.  Dispense: 60 tablet; Refill: 10      Reviewed cytology screening and mammogram guidelines and breast awareness. Mammogram done today.  Iron rx for anemia.  Dr. Rooney called to bedside for further evaluation of cervical finding, possible fibroid vs. polyp. US done today does not clearly indicate etiology of lesion; pt to schedule with Dr. Rooney for follow-up to remove lesion. Return as needed.

## 2021-04-05 ENCOUNTER — OFFICE VISIT (OUTPATIENT)
Dept: OBSTETRICS AND GYNECOLOGY | Facility: CLINIC | Age: 41
End: 2021-04-05

## 2021-04-05 VITALS
WEIGHT: 150.6 LBS | DIASTOLIC BLOOD PRESSURE: 72 MMHG | BODY MASS INDEX: 29.57 KG/M2 | HEIGHT: 60 IN | SYSTOLIC BLOOD PRESSURE: 114 MMHG

## 2021-04-05 DIAGNOSIS — N88.8 CERVICAL MASS: Primary | ICD-10-CM

## 2021-04-05 LAB
LAB AP CASE REPORT: NORMAL
PATH INTERP SPEC-IMP: NORMAL

## 2021-04-05 PROCEDURE — 99213 OFFICE O/P EST LOW 20 MIN: CPT | Performed by: OBSTETRICS & GYNECOLOGY

## 2021-04-05 NOTE — PROGRESS NOTES
Viridiana Mota is a 41 y.o. y/o female.     Chief Complaint: Follow-up cervical mass; abnormal vaginal bleeding    HPI:   41 y.o. .  Patient's last menstrual period was 2021 (within days)..  Patient has had abnormal vaginal bleeding for some time and it responded somewhat to placement of a Mirena IUD.  She was seen late last week and I was asked to see patient.  Ultrasound was performed and I viewed this is it was done and have reviewed with sonographer and have reviewed the report there is a lesion that is none echogenic endocervix I thought originally this was a fibroid but it does not shadow like one          Review of Systems     Constitutional: Denies night sweats    HENT: No hearing changes, denies ear pain    Eye: No eye pain; no foreign body in eye    Pulmonary: No hemoptysis    Cardiovascular: No claudication    GI: No hematemesis    Musculoskeletal: No arthralgias, no joint swelling    Endocrine: No polydipsia or polyuria    Hematologic: Denies any free bleeding    Psychiatric: Denies any delusions    The following portions of the patient's history were reviewed and updated as appropriate: allergies, current medications, past family history, past medical history, past social history, past surgical history and problem list.    No Known Allergies     Outpatient Medications Prior to Visit   Medication Sig Dispense Refill   • ferrous sulfate 325 (65 FE) MG tablet Take one pill twice a day every other day. 60 tablet 10   • venlafaxine XR (Effexor XR) 75 MG 24 hr capsule Take 1 capsule by mouth Daily. 30 capsule 2   • predniSONE (DELTASONE) 10 MG tablet Take 1 tablet by mouth See Admin Instructions. Take 1 Tab Tid x3 days, Then 1 Tab Bid x 2 days Then 1 Tab QD x4 days,then D/C 17 tablet 0     No facility-administered medications prior to visit.        The patient has a family history of   Family History   Problem Relation Age of Onset   • Heart attack Mother    • Cancer Father         Past Medical  "History:   Diagnosis Date   • Anxiety    • Arthritis    • Depression         OB History        6    Para   2    Term   2            AB   2    Living   2       SAB   2    TAB        Ectopic        Molar        Multiple        Live Births   2                 Social History     Socioeconomic History   • Marital status: Legally      Spouse name: Not on file   • Number of children: Not on file   • Years of education: Not on file   • Highest education level: Not on file   Tobacco Use   • Smoking status: Current Some Day Smoker     Packs/day: 0.50     Types: Cigarettes   • Smokeless tobacco: Never Used   • Tobacco comment: smokes about 1 cigarette per day   Substance and Sexual Activity   • Alcohol use: No   • Drug use: No   • Sexual activity: Defer        No past surgical history on file.     Patient Active Problem List   Diagnosis   • Closed fracture of base of fifth metacarpal bone   • Tick bite   • Arthritis   • Encounter to establish care with new doctor   • Depression   • Menorrhagia with irregular cycle   • Fatigue   • Cervical mass        Documented Vitals    21 1020   BP: 114/72   Weight: 68.3 kg (150 lb 9.6 oz)   Height: 152.4 cm (60\")   PainSc: 0-No pain        Body mass index is 29.41 kg/m².    Physical Exam  Constitutional: Appears to be in no acute distress; Eyes: Sclerae normal; Endocrine system: Thyroid palpation is normal; Pulmonary system: Lungs clear; Cardiovascular system: Heart regular rate and rhythm; Gastrointestinal system: Abdomen soft and nontender, active bowel sounds; Urologic system: CVA negative; Psychiatric: Appropriate insight; Neurologic: Gait within normal limits female genital system moderate amount of blood in vault at the cervix which is open consistent with prior history of a LEEP in [place] a number of years ago is around about 1-1/2 to 2 cm lesion coming through the cervix.    Laboratory Data:   Lab Results - Last 18 Months   Lab Units 21  1018 " 10/02/20  1439 02/04/20  1449   GLUCOSE mg/dL 84 98 91   BUN mg/dL 15 9 12   CREATININE mg/dL 0.81 0.74 0.76   SODIUM mmol/L 137 140 136   POTASSIUM mmol/L 4.6 4.4 4.1   CHLORIDE mmol/L 103 103 98   CO2 mmol/L 23.2 27.9 23.1   CALCIUM mg/dL 9.6 9.5 9.4   TOTAL PROTEIN g/dL 8.0 7.1 7.3   ALBUMIN g/dL 4.80 4.40 4.80   ALT (SGPT) U/L 9 9 12   AST (SGOT) U/L 19 14 19   ALK PHOS U/L 79 71 58   BILIRUBIN mg/dL 0.2 <0.2 0.3   EGFR IF NONAFRICN AM mL/min/1.73 78 87 85   GLOBULIN gm/dL 3.2 2.7 2.5   A/G RATIO g/dL 1.5 1.6 1.9   BUN / CREAT RATIO  18.5 12.2 15.8   ANION GAP mmol/L 10.8 9.1 14.9     Lab Results - Last 18 Months   Lab Units 03/19/21  1018 06/30/20  1054 02/04/20  1449   WBC 10*3/mm3 9.97 8.06 9.40   RBC 10*6/mm3 5.01 4.27 4.39   HEMOGLOBIN g/dL 9.8* 13.2 14.2   HEMATOCRIT % 34.7 38.6 41.3   MCV fL 69.3* 90.4 94.1   MCH pg 19.6* 30.9 32.3   MCHC g/dL 28.2* 34.2 34.4   RDW % 18.6* 12.4 12.7   RDW-SD fl 44.8 40.6 43.9   MPV fL 9.9 10.3 9.9   PLATELETS 10*3/mm3 499* 355 311     No results for input(s): HCGQUAL in the last 01365 hours.    Assessment        Diagnosis Plan   1. Cervical mass  Case request    Case request   The recommendation of the ultrasound had been for colposcopy but patient wants procedure done in OR I would like to see what results of cytology has before final planning and will have patient seen back on Thursday    Plan       No orders of the defined types were placed in this encounter.            This document has been electronically signed by John Rooney MD on April 5, 2021 18:15 CDT    Please note that portions of this note were completed with a voice recognition program.

## 2021-04-07 ENCOUNTER — OFFICE VISIT (OUTPATIENT)
Dept: FAMILY MEDICINE CLINIC | Facility: CLINIC | Age: 41
End: 2021-04-07

## 2021-04-07 VITALS
HEART RATE: 113 BPM | SYSTOLIC BLOOD PRESSURE: 124 MMHG | BODY MASS INDEX: 29.39 KG/M2 | TEMPERATURE: 98.4 F | WEIGHT: 149.7 LBS | DIASTOLIC BLOOD PRESSURE: 82 MMHG | OXYGEN SATURATION: 98 % | HEIGHT: 60 IN

## 2021-04-07 DIAGNOSIS — Z87.19 H/O GASTROESOPHAGEAL REFLUX (GERD): ICD-10-CM

## 2021-04-07 DIAGNOSIS — M19.90 ARTHRITIS: ICD-10-CM

## 2021-04-07 DIAGNOSIS — F32.A DEPRESSION, UNSPECIFIED DEPRESSION TYPE: ICD-10-CM

## 2021-04-07 PROCEDURE — 99214 OFFICE O/P EST MOD 30 MIN: CPT | Performed by: FAMILY MEDICINE

## 2021-04-07 RX ORDER — CELECOXIB 100 MG/1
100 CAPSULE ORAL 2 TIMES DAILY PRN
Qty: 60 CAPSULE | Refills: 1 | Status: SHIPPED | OUTPATIENT
Start: 2021-04-07 | End: 2021-05-17 | Stop reason: SDUPTHER

## 2021-04-07 RX ORDER — VENLAFAXINE HYDROCHLORIDE 75 MG/1
75 CAPSULE, EXTENDED RELEASE ORAL DAILY
Qty: 30 CAPSULE | Refills: 0 | Status: SHIPPED | OUTPATIENT
Start: 2021-04-07 | End: 2021-04-08

## 2021-04-07 NOTE — PROGRESS NOTES
"Chief Complaint  Depression and Joint Pain    Subjective          Viridiana Mota presents to [facility]  Depression  Visit Type: initial  Onset of symptoms: more than 5 years ago  Progression since onset: waxing and waning  Patient presents with the following symptoms: depressed mood and nervousness/anxiety.  Patient is not experiencing: choking sensation, confusion, decreased concentration, palpitations, shortness of breath and suicidal ideas.  Frequency of symptoms: occasionally   Patient has a history of: depression  Treatment tried: SSRI, lifestyle changes and counseling (CBT)  Compliance with treatment: good  Improvement on treatment: mild (Would like change to Effexor)      Fatigue  This is a chronic problem. The current episode started more than 1 month ago. The symptoms are aggravated by eating. She has tried nothing for the symptoms. The treatment provided no relief.   Osteoarthritis  Presents for initial visit. The disease course has been worsening. Affected location: Multiple joints. Her pain is at a severity of 5/10. Her past medical history is significant for osteoarthritis. Past treatments include acetaminophen and NSAIDs. The treatment provided mild relief.       Objective   Vital Signs:   /82 (BP Location: Right arm, Patient Position: Sitting, Cuff Size: Adult)   Pulse 113   Temp 98.4 °F (36.9 °C) (Temporal)   Ht 152.4 cm (60\")   Wt 67.9 kg (149 lb 11.2 oz)   SpO2 98%   BMI 29.24 kg/m²     Physical Exam  Vitals and nursing note reviewed.   Constitutional:       Appearance: Normal appearance. She is well-developed.   HENT:      Head: Normocephalic and atraumatic.      Right Ear: Tympanic membrane, ear canal and external ear normal.      Left Ear: Tympanic membrane, ear canal and external ear normal.      Nose: Nose normal.      Mouth/Throat:      Mouth: Mucous membranes are moist.      Pharynx: Oropharynx is clear.   Eyes:      Extraocular Movements: " Extraocular movements intact.      Conjunctiva/sclera: Conjunctivae normal.      Pupils: Pupils are equal, round, and reactive to light.   Cardiovascular:      Rate and Rhythm: Normal rate and regular rhythm.      Heart sounds: Normal heart sounds.   Pulmonary:      Effort: Pulmonary effort is normal.      Breath sounds: Normal breath sounds.   Abdominal:      General: Bowel sounds are normal. There is no distension.      Palpations: Abdomen is soft.      Tenderness: There is abdominal tenderness.      Comments: RLQ   Musculoskeletal:         General: Tenderness present.      Cervical back: Normal range of motion and neck supple.      Comments: WB with ROM of knees, back, hands.   Skin:     General: Skin is warm and dry.   Neurological:      Mental Status: She is alert and oriented to person, place, and time.   Psychiatric:         Mood and Affect: Mood normal.         Speech: Speech normal.         Behavior: Behavior normal.         Thought Content: Thought content normal.         Judgment: Judgment normal.        Result Review :                 Assessment and Plan    Diagnoses and all orders for this visit:    1. Arthritis  -     celecoxib (CeleBREX) 100 MG capsule; Take 1 capsule by mouth 2 (Two) Times a Day As Needed for Mild Pain .  Dispense: 60 capsule; Refill: 1    2. Depression, unspecified depression type  -     Discontinue: venlafaxine XR (Effexor XR) 75 MG 24 hr capsule; Take 1 capsule by mouth Daily. (Patient taking differently: Take 75 mg by mouth Every Evening.)  Dispense: 30 capsule; Refill: 0    3. H/O gastroesophageal reflux (GERD)    Discussed exam, health problems, meds, indications, tx plan, and rationale. Discussed trial of celebrex. Discussed F/U plan.    Follow Up   Return in about 3 months (around 7/7/2021).  Patient was given instructions and counseling regarding her condition or for health maintenance advice. Please see specific information pulled into the AVS if appropriate.         This  document has been electronically signed by Lane Rod MD

## 2021-04-08 ENCOUNTER — PRE-ADMISSION TESTING (OUTPATIENT)
Dept: PREADMISSION TESTING | Facility: HOSPITAL | Age: 41
End: 2021-04-08

## 2021-04-08 ENCOUNTER — OFFICE VISIT (OUTPATIENT)
Dept: OBSTETRICS AND GYNECOLOGY | Facility: CLINIC | Age: 41
End: 2021-04-08

## 2021-04-08 VITALS — HEART RATE: 80 BPM | OXYGEN SATURATION: 98 %

## 2021-04-08 VITALS
HEIGHT: 60 IN | WEIGHT: 150.8 LBS | DIASTOLIC BLOOD PRESSURE: 82 MMHG | SYSTOLIC BLOOD PRESSURE: 132 MMHG | BODY MASS INDEX: 29.61 KG/M2

## 2021-04-08 DIAGNOSIS — N88.8 CERVICAL MASS: Primary | ICD-10-CM

## 2021-04-08 PROCEDURE — 99214 OFFICE O/P EST MOD 30 MIN: CPT | Performed by: OBSTETRICS & GYNECOLOGY

## 2021-04-08 RX ORDER — SODIUM CHLORIDE, SODIUM LACTATE, POTASSIUM CHLORIDE, CALCIUM CHLORIDE 600; 310; 30; 20 MG/100ML; MG/100ML; MG/100ML; MG/100ML
125 INJECTION, SOLUTION INTRAVENOUS CONTINUOUS
Status: CANCELLED | OUTPATIENT
Start: 2021-04-13

## 2021-04-08 RX ORDER — MAGNESIUM 200 MG
1 TABLET ORAL DAILY
COMMUNITY
End: 2021-05-17 | Stop reason: SDUPTHER

## 2021-04-08 RX ORDER — BUPIVACAINE HCL/0.9 % NACL/PF 0.1 %
2 PLASTIC BAG, INJECTION (ML) EPIDURAL ONCE
Status: CANCELLED | OUTPATIENT
Start: 2021-04-13 | End: 2021-04-08

## 2021-04-08 RX ORDER — VENLAFAXINE HYDROCHLORIDE 75 MG/1
75 CAPSULE, EXTENDED RELEASE ORAL NIGHTLY
COMMUNITY
End: 2021-05-24 | Stop reason: SDUPTHER

## 2021-04-08 RX ORDER — IBUPROFEN 800 MG
1 TABLET ORAL DAILY
COMMUNITY

## 2021-04-09 NOTE — PROGRESS NOTES
Chief complaint abnormal uterine bleeding; cervical lesion    Please see H&P dictated through hospital

## 2021-04-09 NOTE — H&P (VIEW-ONLY)
History and Physical    Viridiana Mota is a 41 y.o. y/o female.     Chief Complaint: Bleeding in lesion protruding per cervix    HPI:   41 y.o. .  Patient's last menstrual period was 2021..  Patient with history of abnormal uterine bleeding had the Mirena IUD placed did not help recently examined and found to have rounded lesion protruding from cervix.  It seemed to be more solid than a polyp on ultrasound hypoechoic but not not have necessarily typical characteristics of fibroid.  Suggestion by ultrasound was for colposcopy but patient did not want to have procedure done in office.  I discussed the risk benefits alternatives extensively with patient.  Pap smear has been returned as being negative.  After reviewing the risk benefits alternatives work on a plan for evaluation in the operating room with hysteroscopy if possible this may not be possible because the patient cervix is extremely patulous from her prior LEEP procedure.I reviewed the risks, benefits, and alternatives to hysteroscopy with endometrial sampling either by a D & C and/or endometrial biopsy. I reviewed the risk of cervical damage with cervical incompetence or cervical laceration and need for repair. I reviewed the risk of damage to the uterus such as adhesions or uterine perforation with need for additional surgery such as repair of uterus or rarely hysterectomy. I reviewed the risk of damage to bowel with need for laparotomy. Alternatives reviewed and questions answered.  The possibility of displacing or removing the IUD with the hysteroscope is reviewed..  If the lesion is removed the possibility of requiring hysterectomy for emergent bleeding is reviewed and the risk of hysterectomy is reviewed. I reviewed the risks, benefits, and alternatives of total abdominal hysterectomy today including the small but real risk of mortality. I reviewed the risk of vesicovaginal fistula with incontinence until it can be repaired. I  reviewed the risk of damage to ureter with need for nephrectomy. I reviewed the risk of damage to the bowel with possible colostomy and/or sepsis and/or late return to OR. I reviewed the risk of hemorrhage with need for transfusion and/or late return to OR. I reviewed the risk of infection in the pelvis or in the abdominal wall. I reviewed the risk of blood clots in the leg with possible emboli to the lungs. I reviewed how her medical and surgical history affect her risk. I reviewed alternate methods of hysterectomy and why I'm recommending abdominal approach and offered referral if desired. I reviewed alternate methods of treatment for her condition. Questions answered at length.  If we do a hysterectomy will plan for ovarian conservation but she understands that ovarian removal is possible. Patient understands that one or both of the ovaries may be removed depending on findings during the case. She understands that one or both of the ovaries may be removed because of advance during the case section is bleeding. She understands that removal of the ovaries will cause surgical menopause and its implications including possible increase in mortality. She understands surgical complications associated with removal of the ovaries including bleeding and damage to the ureter.       Review of Systems     Constitutional: Denies night sweats    HENT: No hearing changes, denies ear pain    Eye: No eye pain; no foreign body in eye    Pulmonary: No hemoptysis    Cardiovascular: No claudication    GI: No hematemesis    Musculoskeletal: No arthralgias, no joint swelling    Endocrine: No polydipsia or polyuria    Hematologic: Denies any free bleeding    Psychiatric: Denies any delusions      The following portions of the patient's history were reviewed and updated as appropriate: allergies, current medications, past family history, past medical history, past social history, past surgical history and problem list.    No Known  Allergies     Prior to Admission medications    Medication Sig Start Date End Date Taking? Authorizing Provider   celecoxib (CeleBREX) 100 MG capsule Take 1 capsule by mouth 2 (Two) Times a Day As Needed for Mild Pain . 21  Yes Lane Rdo MD   pediatric multivitamin-iron (POLY-VI-SOL with IRON) 10 MG/ML drops Take 1 mL by mouth Daily.   Yes Guevara Fajardo MD   venlafaxine XR (Effexor XR) 75 MG 24 hr capsule Take 1 capsule by mouth Daily.  Patient taking differently: Take 75 mg by mouth Every Evening. 21 Yes Lane Rod MD   Cholecalciferol (Vitamin D3) 10 MCG (400 UNIT) capsule Take 1 capsule by mouth Daily.    Guevara Fajardo MD   Magnesium 200 MG tablet Take 1 tablet by mouth Daily.    Guevara Fajardo MD   venlafaxine XR (EFFEXOR-XR) 75 MG 24 hr capsule Take 75 mg by mouth Every Night.    Guevara Fajardo MD   ferrous sulfate 325 (65 FE) MG tablet Take one pill twice a day every other day. 3/31/21 4/8/21  Jagruti Huddleston APRN       The patient has a family history of   Family History   Problem Relation Age of Onset   • Heart attack Mother    • Cancer Father         Past Medical History:   Diagnosis Date   • Anxiety    • Arthritis    • Depression    • Hyperlipidemia         OB History        6    Para   2    Term   2            AB   2    Living   2       SAB   2    TAB        Ectopic        Molar        Multiple        Live Births   2                 Social History     Socioeconomic History   • Marital status:      Spouse name: Not on file   • Number of children: Not on file   • Years of education: Not on file   • Highest education level: Not on file   Tobacco Use   • Smoking status: Current Some Day Smoker     Packs/day: 0.25     Types: Cigarettes   • Smokeless tobacco: Never Used   Vaping Use   • Vaping Use: Never used   Substance and Sexual Activity   • Alcohol use: Yes     Comment: occasionally   • Drug use: No   • Sexual  "activity: Defer        Past Surgical History:   Procedure Laterality Date   • LEEP     • TOOTH EXTRACTION          Patient Active Problem List   Diagnosis   • Closed fracture of base of fifth metacarpal bone   • Tick bite   • Arthritis   • Encounter to establish care with new doctor   • Depression   • Menorrhagia with irregular cycle   • Fatigue   • Cervical mass        Documented Vitals    04/08/21 0834   BP: 132/82   Weight: 68.4 kg (150 lb 12.8 oz)   Height: 152.4 cm (60\")   PainSc:   4        Body mass index is 29.45 kg/m².    Physical Exam  Constitutional: Appears to be in no acute distress; Eyes: Sclera normal; Endocrine system: Thyroid palpate is normal; Pulmonary system: Lungs clear; Cardiovascular system: Heart regular rate and rhythm; Gastrointestinal system: Abdomen soft, nontender, active bowel sounds; Urologic system: CVA negative; Psychiatric: Appropriate insight; Neurologic: Gait within normal limits.      Last Labs  Lab Results   Component Value Date    WBC 9.97 03/19/2021    RBC 5.01 03/19/2021    HGB 9.8 (L) 03/19/2021    HCT 34.7 03/19/2021    MCV 69.3 (L) 03/19/2021    MCH 19.6 (L) 03/19/2021    MCHC 28.2 (L) 03/19/2021    RDW 18.6 (H) 03/19/2021    RDWSD 44.8 03/19/2021    MPV 9.9 03/19/2021     (H) 03/19/2021        Lab Results   Component Value Date    GLUCOSE 84 03/19/2021    BUN 15 03/19/2021    CREATININE 0.81 03/19/2021     03/19/2021    K 4.6 03/19/2021     03/19/2021    CO2 23.2 03/19/2021    CALCIUM 9.6 03/19/2021    PROTEINTOT 8.0 03/19/2021    ALBUMIN 4.80 03/19/2021    ALT 9 03/19/2021    AST 19 03/19/2021    ALKPHOS 79 03/19/2021    BILITOT 0.2 03/19/2021    EGFRIFNONA 78 03/19/2021    GLOB 3.2 03/19/2021    AGRATIO 1.5 03/19/2021    BCR 18.5 03/19/2021    ANIONGAP 10.8 03/19/2021       No results found for: HCGQUAL    Assessment &Plan: Unusual case of abnormal uterine bleeding with lesion protruding through cervix about 2 cm hypoechoic possibly a fibroid though " it does not have totally typical ultrasound characteristics of this.  Pap smear negative we will plan for removal under anesthesia possibly hysteroscopically.  We will plan for hysterectomy if there is heavy bleeding associated with removal of lesion patient wishes no further childbearing with possible bilateral salpingo-oophorectomy depending on findings      Plan of care has been reviewed with staff, patient and family.  Risks, benefits of treatment plan have been discussed.  All questions have been answered.     Viridiana Mota and I have discussed pain goals for this hospitalization after reviewing her current clinical condition, medical history and prior pain experiences.  The goal is to keep her pain level 3.  To help achieve this, I plan to multimodal pain management.        This document has been electronically signed by John Rooney MD on April 8, 2021 21:10 CDT    Please note that portions of this note were completed with a voice recognition program.

## 2021-04-09 NOTE — H&P
History and Physical    Viridiana Mota is a 41 y.o. y/o female.     Chief Complaint: Bleeding in lesion protruding per cervix    HPI:   41 y.o. .  Patient's last menstrual period was 2021..  Patient with history of abnormal uterine bleeding had the Mirena IUD placed did not help recently examined and found to have rounded lesion protruding from cervix.  It seemed to be more solid than a polyp on ultrasound hypoechoic but not not have necessarily typical characteristics of fibroid.  Suggestion by ultrasound was for colposcopy but patient did not want to have procedure done in office.  I discussed the risk benefits alternatives extensively with patient.  Pap smear has been returned as being negative.  After reviewing the risk benefits alternatives work on a plan for evaluation in the operating room with hysteroscopy if possible this may not be possible because the patient cervix is extremely patulous from her prior LEEP procedure.I reviewed the risks, benefits, and alternatives to hysteroscopy with endometrial sampling either by a D & C and/or endometrial biopsy. I reviewed the risk of cervical damage with cervical incompetence or cervical laceration and need for repair. I reviewed the risk of damage to the uterus such as adhesions or uterine perforation with need for additional surgery such as repair of uterus or rarely hysterectomy. I reviewed the risk of damage to bowel with need for laparotomy. Alternatives reviewed and questions answered.  The possibility of displacing or removing the IUD with the hysteroscope is reviewed..  If the lesion is removed the possibility of requiring hysterectomy for emergent bleeding is reviewed and the risk of hysterectomy is reviewed. I reviewed the risks, benefits, and alternatives of total abdominal hysterectomy today including the small but real risk of mortality. I reviewed the risk of vesicovaginal fistula with incontinence until it can be repaired. I  reviewed the risk of damage to ureter with need for nephrectomy. I reviewed the risk of damage to the bowel with possible colostomy and/or sepsis and/or late return to OR. I reviewed the risk of hemorrhage with need for transfusion and/or late return to OR. I reviewed the risk of infection in the pelvis or in the abdominal wall. I reviewed the risk of blood clots in the leg with possible emboli to the lungs. I reviewed how her medical and surgical history affect her risk. I reviewed alternate methods of hysterectomy and why I'm recommending abdominal approach and offered referral if desired. I reviewed alternate methods of treatment for her condition. Questions answered at length.  If we do a hysterectomy will plan for ovarian conservation but she understands that ovarian removal is possible. Patient understands that one or both of the ovaries may be removed depending on findings during the case. She understands that one or both of the ovaries may be removed because of advance during the case section is bleeding. She understands that removal of the ovaries will cause surgical menopause and its implications including possible increase in mortality. She understands surgical complications associated with removal of the ovaries including bleeding and damage to the ureter.       Review of Systems     Constitutional: Denies night sweats    HENT: No hearing changes, denies ear pain    Eye: No eye pain; no foreign body in eye    Pulmonary: No hemoptysis    Cardiovascular: No claudication    GI: No hematemesis    Musculoskeletal: No arthralgias, no joint swelling    Endocrine: No polydipsia or polyuria    Hematologic: Denies any free bleeding    Psychiatric: Denies any delusions      The following portions of the patient's history were reviewed and updated as appropriate: allergies, current medications, past family history, past medical history, past social history, past surgical history and problem list.    No Known  Allergies     Prior to Admission medications    Medication Sig Start Date End Date Taking? Authorizing Provider   celecoxib (CeleBREX) 100 MG capsule Take 1 capsule by mouth 2 (Two) Times a Day As Needed for Mild Pain . [address]  [address] Rod MD   pediatric multivitamin-iron (POLY-VI-SOL with IRON) 10 MG/ML drops Take 1 mL by mouth Daily.   Yes Guevara Fajardo MD   venlafaxine XR (Effexor XR) 75 MG 24 hr capsule Take 1 capsule by mouth Daily.  Patient taking differently: Take 75 mg by mouth Every Evening. [address] Rod MD   Cholecalciferol (Vitamin D3) 10 MCG (400 UNIT) capsule Take 1 capsule by mouth Daily.    Guevara Fajardo MD   Magnesium 200 MG tablet Take 1 tablet by mouth Daily.    Guevara Fajardo MD   venlafaxine XR (EFFEXOR-XR) 75 MG 24 hr capsule Take 75 mg by mouth Every Night.    Guevara Fajardo MD   ferrous sulfate 325 (65 FE) MG tablet Take one pill twice a day every other day. 3/31/21 4/8/21  Jagruti Huddleston APRN       The patient has a family history of   Family History   Problem Relation Age of Onset   • Heart attack Mother    • Cancer Father         Past Medical History:   Diagnosis Date   • Anxiety    • Arthritis    • Depression    • Hyperlipidemia         OB History        6    Para   2    Term   2            AB   2    Living   2       SAB   2    TAB        Ectopic        Molar        Multiple        Live Births   2                 Social History     Socioeconomic History   • Marital status:      Spouse name: Not on file   • Number of children: Not on file   • Years of education: Not on file   • Highest education level: Not on file   Tobacco Use   • Smoking status: Current Some Day Smoker     Packs/day: 0.25     Types: Cigarettes   • Smokeless tobacco: Never Used   Vaping Use   • Vaping Use: Never used   Substance and Sexual Activity   • Alcohol use: Yes     Comment: occasionally   • Drug use: No   • Sexual  "activity: Defer        Past Surgical History:   Procedure Laterality Date   • LEEP     • TOOTH EXTRACTION          Patient Active Problem List   Diagnosis   • Closed fracture of base of fifth metacarpal bone   • Tick bite   • Arthritis   • Encounter to establish care with new doctor   • Depression   • Menorrhagia with irregular cycle   • Fatigue   • Cervical mass        Documented Vitals    04/08/21 0834   BP: 132/82   Weight: 68.4 kg (150 lb 12.8 oz)   Height: 152.4 cm (60\")   PainSc:   4        Body mass index is 29.45 kg/m².    Physical Exam  Constitutional: Appears to be in no acute distress; Eyes: Sclera normal; Endocrine system: Thyroid palpate is normal; Pulmonary system: Lungs clear; Cardiovascular system: Heart regular rate and rhythm; Gastrointestinal system: Abdomen soft, nontender, active bowel sounds; Urologic system: CVA negative; Psychiatric: Appropriate insight; Neurologic: Gait within normal limits.      Last Labs  Lab Results   Component Value Date    WBC 9.97 03/19/2021    RBC 5.01 03/19/2021    HGB 9.8 (L) 03/19/2021    HCT 34.7 03/19/2021    MCV 69.3 (L) 03/19/2021    MCH 19.6 (L) 03/19/2021    MCHC 28.2 (L) 03/19/2021    RDW 18.6 (H) 03/19/2021    RDWSD 44.8 03/19/2021    MPV 9.9 03/19/2021     (H) 03/19/2021        Lab Results   Component Value Date    GLUCOSE 84 03/19/2021    BUN 15 03/19/2021    CREATININE 0.81 03/19/2021     03/19/2021    K 4.6 03/19/2021     03/19/2021    CO2 23.2 03/19/2021    CALCIUM 9.6 03/19/2021    PROTEINTOT 8.0 03/19/2021    ALBUMIN 4.80 03/19/2021    ALT 9 03/19/2021    AST 19 03/19/2021    ALKPHOS 79 03/19/2021    BILITOT 0.2 03/19/2021    EGFRIFNONA 78 03/19/2021    GLOB 3.2 03/19/2021    AGRATIO 1.5 03/19/2021    BCR 18.5 03/19/2021    ANIONGAP 10.8 03/19/2021       No results found for: HCGQUAL    Assessment &Plan: Unusual case of abnormal uterine bleeding with lesion protruding through cervix about 2 cm hypoechoic possibly a fibroid though " it does not have totally typical ultrasound characteristics of this.  Pap smear negative we will plan for removal under anesthesia possibly hysteroscopically.  We will plan for hysterectomy if there is heavy bleeding associated with removal of lesion patient wishes no further childbearing with possible bilateral salpingo-oophorectomy depending on findings      Plan of care has been reviewed with staff, patient and family.  Risks, benefits of treatment plan have been discussed.  All questions have been answered.     Viridiana Mota and I have discussed pain goals for this hospitalization after reviewing her current clinical condition, medical history and prior pain experiences.  The goal is to keep her pain level 3.  To help achieve this, I plan to multimodal pain management.        This document has been electronically signed by John Rooney MD on April 8, 2021 21:10 CDT    Please note that portions of this note were completed with a voice recognition program.

## 2021-04-10 ENCOUNTER — LAB (OUTPATIENT)
Dept: LAB | Facility: HOSPITAL | Age: 41
End: 2021-04-10

## 2021-04-10 DIAGNOSIS — Z01.818 PREOP TESTING: Primary | ICD-10-CM

## 2021-04-10 PROBLEM — Z87.19 H/O GASTROESOPHAGEAL REFLUX (GERD): Status: ACTIVE | Noted: 2021-04-10

## 2021-04-10 LAB — SARS-COV-2 N GENE RESP QL NAA+PROBE: NOT DETECTED

## 2021-04-10 PROCEDURE — 87635 SARS-COV-2 COVID-19 AMP PRB: CPT

## 2021-04-10 PROCEDURE — C9803 HOPD COVID-19 SPEC COLLECT: HCPCS

## 2021-04-10 RX ORDER — OMEPRAZOLE 40 MG/1
40 CAPSULE, DELAYED RELEASE ORAL DAILY
Qty: 30 CAPSULE | Refills: 1
Start: 2021-04-10 | End: 2021-05-17 | Stop reason: SDUPTHER

## 2021-04-12 ENCOUNTER — ANESTHESIA EVENT (OUTPATIENT)
Dept: PERIOP | Facility: HOSPITAL | Age: 41
End: 2021-04-12

## 2021-04-13 ENCOUNTER — HOSPITAL ENCOUNTER (OUTPATIENT)
Facility: HOSPITAL | Age: 41
Discharge: HOME | End: 2021-04-14
Attending: OBSTETRICS & GYNECOLOGY | Admitting: OBSTETRICS & GYNECOLOGY

## 2021-04-13 ENCOUNTER — ANESTHESIA (OUTPATIENT)
Dept: PERIOP | Facility: HOSPITAL | Age: 41
End: 2021-04-13

## 2021-04-13 DIAGNOSIS — N88.8 CERVICAL MASS: ICD-10-CM

## 2021-04-13 DIAGNOSIS — Z98.890 POSTOPERATIVE STATE: Primary | ICD-10-CM

## 2021-04-13 LAB
ABO GROUP BLD: NORMAL
ABO GROUP BLD: NORMAL
B-HCG UR QL: NEGATIVE
BLD GP AB SCN SERPL QL: NEGATIVE
Lab: NORMAL
RH BLD: POSITIVE
RH BLD: POSITIVE
T&S EXPIRATION DATE: NORMAL

## 2021-04-13 PROCEDURE — 25010000002 KETOROLAC TROMETHAMINE PER 15 MG: Performed by: OBSTETRICS & GYNECOLOGY

## 2021-04-13 PROCEDURE — 81025 URINE PREGNANCY TEST: CPT | Performed by: ANESTHESIOLOGY

## 2021-04-13 PROCEDURE — 88331 PATH CONSLTJ SURG 1 BLK 1SPC: CPT

## 2021-04-13 PROCEDURE — 58301 REMOVE INTRAUTERINE DEVICE: CPT | Performed by: OBSTETRICS & GYNECOLOGY

## 2021-04-13 PROCEDURE — 86901 BLOOD TYPING SEROLOGIC RH(D): CPT

## 2021-04-13 PROCEDURE — 86850 RBC ANTIBODY SCREEN: CPT | Performed by: OBSTETRICS & GYNECOLOGY

## 2021-04-13 PROCEDURE — 86900 BLOOD TYPING SEROLOGIC ABO: CPT | Performed by: OBSTETRICS & GYNECOLOGY

## 2021-04-13 PROCEDURE — 25010000002 PROPOFOL 10 MG/ML EMULSION: Performed by: NURSE ANESTHETIST, CERTIFIED REGISTERED

## 2021-04-13 PROCEDURE — 94799 UNLISTED PULMONARY SVC/PX: CPT

## 2021-04-13 PROCEDURE — 25010000002 FENTANYL CITRATE (PF) 100 MCG/2ML SOLUTION: Performed by: NURSE ANESTHETIST, CERTIFIED REGISTERED

## 2021-04-13 PROCEDURE — 25010000002 ONDANSETRON PER 1 MG: Performed by: NURSE ANESTHETIST, CERTIFIED REGISTERED

## 2021-04-13 PROCEDURE — 86900 BLOOD TYPING SEROLOGIC ABO: CPT

## 2021-04-13 PROCEDURE — 88300 SURGICAL PATH GROSS: CPT

## 2021-04-13 PROCEDURE — 57500 BIOPSY OF CERVIX: CPT | Performed by: OBSTETRICS & GYNECOLOGY

## 2021-04-13 PROCEDURE — 86901 BLOOD TYPING SEROLOGIC RH(D): CPT | Performed by: OBSTETRICS & GYNECOLOGY

## 2021-04-13 PROCEDURE — 58555 HYSTEROSCOPY DX SEP PROC: CPT | Performed by: OBSTETRICS & GYNECOLOGY

## 2021-04-13 PROCEDURE — 25010000002 MIDAZOLAM PER 1 MG: Performed by: NURSE ANESTHETIST, CERTIFIED REGISTERED

## 2021-04-13 PROCEDURE — 88305 TISSUE EXAM BY PATHOLOGIST: CPT

## 2021-04-13 PROCEDURE — 25010000002 CEFAZOLIN PER 500 MG: Performed by: OBSTETRICS & GYNECOLOGY

## 2021-04-13 RX ORDER — ACETAMINOPHEN 650 MG/1
650 SUPPOSITORY RECTAL ONCE AS NEEDED
Status: DISCONTINUED | OUTPATIENT
Start: 2021-04-13 | End: 2021-04-13 | Stop reason: HOSPADM

## 2021-04-13 RX ORDER — NALOXONE HCL 0.4 MG/ML
0.1 VIAL (ML) INJECTION
Status: DISCONTINUED | OUTPATIENT
Start: 2021-04-13 | End: 2021-04-14 | Stop reason: HOSPADM

## 2021-04-13 RX ORDER — PROPOFOL 10 MG/ML
VIAL (ML) INTRAVENOUS AS NEEDED
Status: DISCONTINUED | OUTPATIENT
Start: 2021-04-13 | End: 2021-04-13 | Stop reason: SURG

## 2021-04-13 RX ORDER — DIPHENHYDRAMINE HYDROCHLORIDE 50 MG/ML
12.5 INJECTION INTRAMUSCULAR; INTRAVENOUS
Status: DISCONTINUED | OUTPATIENT
Start: 2021-04-13 | End: 2021-04-13 | Stop reason: HOSPADM

## 2021-04-13 RX ORDER — BUPIVACAINE HCL/0.9 % NACL/PF 0.1 %
2 PLASTIC BAG, INJECTION (ML) EPIDURAL ONCE
Status: COMPLETED | OUTPATIENT
Start: 2021-04-13 | End: 2021-04-13

## 2021-04-13 RX ORDER — IBUPROFEN 800 MG/1
800 TABLET ORAL EVERY 8 HOURS SCHEDULED
Status: DISCONTINUED | OUTPATIENT
Start: 2021-04-13 | End: 2021-04-14 | Stop reason: HOSPADM

## 2021-04-13 RX ORDER — OXYCODONE HYDROCHLORIDE 5 MG/1
5 TABLET ORAL EVERY 4 HOURS PRN
Status: DISCONTINUED | OUTPATIENT
Start: 2021-04-13 | End: 2021-04-14 | Stop reason: HOSPADM

## 2021-04-13 RX ORDER — PROMETHAZINE HYDROCHLORIDE 12.5 MG/1
12.5 SUPPOSITORY RECTAL EVERY 6 HOURS PRN
Status: DISCONTINUED | OUTPATIENT
Start: 2021-04-13 | End: 2021-04-14 | Stop reason: HOSPADM

## 2021-04-13 RX ORDER — MIDAZOLAM HYDROCHLORIDE 1 MG/ML
INJECTION INTRAMUSCULAR; INTRAVENOUS AS NEEDED
Status: DISCONTINUED | OUTPATIENT
Start: 2021-04-13 | End: 2021-04-13 | Stop reason: SURG

## 2021-04-13 RX ORDER — PROMETHAZINE HYDROCHLORIDE 25 MG/1
25 SUPPOSITORY RECTAL ONCE AS NEEDED
Status: DISCONTINUED | OUTPATIENT
Start: 2021-04-13 | End: 2021-04-13 | Stop reason: HOSPADM

## 2021-04-13 RX ORDER — PROMETHAZINE HYDROCHLORIDE 25 MG/1
25 TABLET ORAL ONCE AS NEEDED
Status: DISCONTINUED | OUTPATIENT
Start: 2021-04-13 | End: 2021-04-13 | Stop reason: HOSPADM

## 2021-04-13 RX ORDER — ONDANSETRON 2 MG/ML
4 INJECTION INTRAMUSCULAR; INTRAVENOUS EVERY 6 HOURS PRN
Status: DISCONTINUED | OUTPATIENT
Start: 2021-04-13 | End: 2021-04-14 | Stop reason: HOSPADM

## 2021-04-13 RX ORDER — MEPERIDINE HYDROCHLORIDE 25 MG/ML
12.5 INJECTION INTRAMUSCULAR; INTRAVENOUS; SUBCUTANEOUS
Status: DISCONTINUED | OUTPATIENT
Start: 2021-04-13 | End: 2021-04-13 | Stop reason: HOSPADM

## 2021-04-13 RX ORDER — FLUMAZENIL 0.1 MG/ML
0.2 INJECTION INTRAVENOUS AS NEEDED
Status: DISCONTINUED | OUTPATIENT
Start: 2021-04-13 | End: 2021-04-13 | Stop reason: HOSPADM

## 2021-04-13 RX ORDER — POLYETHYLENE GLYCOL 3350 17 G/17G
17 POWDER, FOR SOLUTION ORAL DAILY
Status: DISCONTINUED | OUTPATIENT
Start: 2021-04-13 | End: 2021-04-14 | Stop reason: HOSPADM

## 2021-04-13 RX ORDER — KETOROLAC TROMETHAMINE 30 MG/ML
30 INJECTION, SOLUTION INTRAMUSCULAR; INTRAVENOUS EVERY 6 HOURS
Status: DISCONTINUED | OUTPATIENT
Start: 2021-04-13 | End: 2021-04-13

## 2021-04-13 RX ORDER — NALOXONE HCL 0.4 MG/ML
0.4 VIAL (ML) INJECTION AS NEEDED
Status: DISCONTINUED | OUTPATIENT
Start: 2021-04-13 | End: 2021-04-13 | Stop reason: HOSPADM

## 2021-04-13 RX ORDER — PROMETHAZINE HYDROCHLORIDE 12.5 MG/1
12.5 TABLET ORAL EVERY 6 HOURS PRN
Status: DISCONTINUED | OUTPATIENT
Start: 2021-04-13 | End: 2021-04-14 | Stop reason: HOSPADM

## 2021-04-13 RX ORDER — SODIUM CHLORIDE, SODIUM LACTATE, POTASSIUM CHLORIDE, CALCIUM CHLORIDE 600; 310; 30; 20 MG/100ML; MG/100ML; MG/100ML; MG/100ML
125 INJECTION, SOLUTION INTRAVENOUS CONTINUOUS
Status: DISCONTINUED | OUTPATIENT
Start: 2021-04-13 | End: 2021-04-13 | Stop reason: SDUPTHER

## 2021-04-13 RX ORDER — BISACODYL 10 MG
10 SUPPOSITORY, RECTAL RECTAL DAILY PRN
Status: DISCONTINUED | OUTPATIENT
Start: 2021-04-13 | End: 2021-04-14 | Stop reason: HOSPADM

## 2021-04-13 RX ORDER — NICOTINE 21 MG/24HR
1 PATCH, TRANSDERMAL 24 HOURS TRANSDERMAL
Status: DISCONTINUED | OUTPATIENT
Start: 2021-04-13 | End: 2021-04-14 | Stop reason: HOSPADM

## 2021-04-13 RX ORDER — EPHEDRINE SULFATE 50 MG/ML
5 INJECTION, SOLUTION INTRAVENOUS ONCE AS NEEDED
Status: DISCONTINUED | OUTPATIENT
Start: 2021-04-13 | End: 2021-04-13 | Stop reason: HOSPADM

## 2021-04-13 RX ORDER — ONDANSETRON 2 MG/ML
4 INJECTION INTRAMUSCULAR; INTRAVENOUS ONCE AS NEEDED
Status: DISCONTINUED | OUTPATIENT
Start: 2021-04-13 | End: 2021-04-13 | Stop reason: HOSPADM

## 2021-04-13 RX ORDER — ONDANSETRON 2 MG/ML
INJECTION INTRAMUSCULAR; INTRAVENOUS AS NEEDED
Status: DISCONTINUED | OUTPATIENT
Start: 2021-04-13 | End: 2021-04-13 | Stop reason: SURG

## 2021-04-13 RX ORDER — ACETAMINOPHEN 325 MG/1
650 TABLET ORAL ONCE AS NEEDED
Status: DISCONTINUED | OUTPATIENT
Start: 2021-04-13 | End: 2021-04-13 | Stop reason: HOSPADM

## 2021-04-13 RX ORDER — ACETAMINOPHEN 500 MG
1000 TABLET ORAL EVERY 8 HOURS
Status: DISCONTINUED | OUTPATIENT
Start: 2021-04-13 | End: 2021-04-14 | Stop reason: HOSPADM

## 2021-04-13 RX ORDER — ONDANSETRON 4 MG/1
4 TABLET, FILM COATED ORAL EVERY 6 HOURS PRN
Status: DISCONTINUED | OUTPATIENT
Start: 2021-04-13 | End: 2021-04-14 | Stop reason: HOSPADM

## 2021-04-13 RX ORDER — IBUPROFEN 800 MG/1
800 TABLET ORAL EVERY 8 HOURS SCHEDULED
Status: DISCONTINUED | OUTPATIENT
Start: 2021-04-14 | End: 2021-04-13

## 2021-04-13 RX ORDER — VENLAFAXINE HYDROCHLORIDE 75 MG/1
75 CAPSULE, EXTENDED RELEASE ORAL NIGHTLY
Status: DISCONTINUED | OUTPATIENT
Start: 2021-04-13 | End: 2021-04-14 | Stop reason: HOSPADM

## 2021-04-13 RX ORDER — CELECOXIB 100 MG/1
100 CAPSULE ORAL 2 TIMES DAILY PRN
Status: DISCONTINUED | OUTPATIENT
Start: 2021-04-13 | End: 2021-04-13 | Stop reason: SDUPTHER

## 2021-04-13 RX ORDER — SODIUM CHLORIDE, SODIUM LACTATE, POTASSIUM CHLORIDE, CALCIUM CHLORIDE 600; 310; 30; 20 MG/100ML; MG/100ML; MG/100ML; MG/100ML
125 INJECTION, SOLUTION INTRAVENOUS CONTINUOUS
Status: DISCONTINUED | OUTPATIENT
Start: 2021-04-13 | End: 2021-04-13

## 2021-04-13 RX ORDER — LIDOCAINE HYDROCHLORIDE 20 MG/ML
INJECTION, SOLUTION INFILTRATION; PERINEURAL AS NEEDED
Status: DISCONTINUED | OUTPATIENT
Start: 2021-04-13 | End: 2021-04-13 | Stop reason: SURG

## 2021-04-13 RX ORDER — FENTANYL CITRATE 50 UG/ML
INJECTION, SOLUTION INTRAMUSCULAR; INTRAVENOUS AS NEEDED
Status: DISCONTINUED | OUTPATIENT
Start: 2021-04-13 | End: 2021-04-13 | Stop reason: SURG

## 2021-04-13 RX ORDER — LANSOPRAZOLE
30 KIT EVERY MORNING
Status: DISCONTINUED | OUTPATIENT
Start: 2021-04-14 | End: 2021-04-14

## 2021-04-13 RX ADMIN — MIDAZOLAM HYDROCHLORIDE 2 MG: 2 INJECTION, SOLUTION INTRAMUSCULAR; INTRAVENOUS at 12:00

## 2021-04-13 RX ADMIN — PROPOFOL 100 MG: 10 INJECTION, EMULSION INTRAVENOUS at 12:08

## 2021-04-13 RX ADMIN — KETOROLAC TROMETHAMINE 30 MG: 30 INJECTION, SOLUTION INTRAMUSCULAR; INTRAVENOUS at 15:34

## 2021-04-13 RX ADMIN — POLYETHYLENE GLYCOL 3350 17 G: 17 POWDER, FOR SOLUTION ORAL at 15:34

## 2021-04-13 RX ADMIN — FENTANYL CITRATE 50 MCG: 50 INJECTION INTRAMUSCULAR; INTRAVENOUS at 12:08

## 2021-04-13 RX ADMIN — FENTANYL CITRATE 25 MCG: 50 INJECTION INTRAMUSCULAR; INTRAVENOUS at 12:18

## 2021-04-13 RX ADMIN — IBUPROFEN 800 MG: 800 TABLET, FILM COATED ORAL at 22:39

## 2021-04-13 RX ADMIN — OXYCODONE 5 MG: 5 TABLET ORAL at 20:10

## 2021-04-13 RX ADMIN — Medication 2 G: at 12:13

## 2021-04-13 RX ADMIN — SODIUM CHLORIDE, POTASSIUM CHLORIDE, SODIUM LACTATE AND CALCIUM CHLORIDE 125 ML/HR: 600; 310; 30; 20 INJECTION, SOLUTION INTRAVENOUS at 10:54

## 2021-04-13 RX ADMIN — ACETAMINOPHEN 1000 MG: 500 TABLET, FILM COATED ORAL at 17:24

## 2021-04-13 RX ADMIN — NICOTINE 1 PATCH: 14 PATCH, EXTENDED RELEASE TRANSDERMAL at 15:34

## 2021-04-13 RX ADMIN — LIDOCAINE HYDROCHLORIDE 50 MG: 20 INJECTION, SOLUTION INFILTRATION; PERINEURAL at 12:08

## 2021-04-13 RX ADMIN — FENTANYL CITRATE 25 MCG: 50 INJECTION INTRAMUSCULAR; INTRAVENOUS at 13:07

## 2021-04-13 RX ADMIN — PROPOFOL 20 MG: 10 INJECTION, EMULSION INTRAVENOUS at 12:14

## 2021-04-13 RX ADMIN — ONDANSETRON 4 MG: 2 INJECTION INTRAMUSCULAR; INTRAVENOUS at 13:07

## 2021-04-13 NOTE — ANESTHESIA PREPROCEDURE EVALUATION
Anesthesia Evaluation     no history of anesthetic complications:  NPO Solid Status: > 8 hours  NPO Liquid Status: > 8 hours           Airway   Mallampati: II  TM distance: >3 FB  Neck ROM: full  Possible difficult intubation  Dental    (+) edentulous, lower dentures and upper dentures    Pulmonary - normal exam    breath sounds clear to auscultation  (+) a smoker (0.5 ppd) Current Smoked day of surgery,   (-) COPD, asthma, sleep apnea  Cardiovascular - normal exam  Exercise tolerance: good (4-7 METS)    Rhythm: regular  Rate: normal    (+) hyperlipidemia,   (-) hypertension, valvular problems/murmurs, dysrhythmias, angina, cardiac stents, DVT      Neuro/Psych  (+) psychiatric history Anxiety and Depression,     (-) seizures, TIA, CVA, headaches, weakness, numbness  GI/Hepatic/Renal/Endo    (+)  GERD well controlled,    (-) hepatitis, liver disease, no renal disease, diabetes, no thyroid disorder    Musculoskeletal     (+) arthralgias, back pain,   Abdominal    Substance History   (+) alcohol use (occ),   (-) drug use     OB/GYN    (-)  Pregnant        Other   arthritis (back and knees), blood dyscrasia,     (-) history of cancer  ROS/Med Hx Other: Cervical mass                  Anesthesia Plan    ASA 3     general   (Possible arterial line and hot line  Possible post op vent discussed)  intravenous induction     Anesthetic plan, all risks, benefits, and alternatives have been provided, discussed and informed consent has been obtained with: patient.  Use of blood products discussed with patient .

## 2021-04-13 NOTE — ANESTHESIA PROCEDURE NOTES
Airway  Urgency: elective    Date/Time: 4/13/2021 12:12 PM  Airway not difficult    General Information and Staff    Patient location during procedure: OR  CRNA: Ej Castaneda CRNA    Indications and Patient Condition  Indications for airway management: airway protection    Preoxygenated: yes  Mask difficulty assessment: 0 - not attempted    Final Airway Details  Final airway type: supraglottic airway      Successful airway: I-gel  Size 4    Number of attempts at approach: 1  Assessment: lips, teeth, and gum same as pre-op and atraumatic intubation

## 2021-04-13 NOTE — OP NOTE
OPERATIVE NOTE  Viridiana Mota  1980 4/13/2021    PREOP DIAGNOSES:  Cervical mass [N88.8] menorrhagia    POSTOP DIAGNOSES:  Post-Op Diagnosis Codes:     * Cervical mass [N88.8]menorrhagia fibroid delivering per cervical os  Procedure(s):  EXAMINATION UNDER ANESTHESIA HYSTEROSCOPY, IUD removal, removal of cervical lesion    SURGEON: John Rooney MD, FACOG       Assistant: Aleisha Norton CSA was responsible for performing the following activities: Retraction, Suction and Irrigation and their skilled assistance was necessary for the success of this case.     STAFF:   Circulator: Laura Smith RN; Wilver Xiong RN  Scrub Person: Jennifer Wilburn  Assistant: Aleisha Norton CSA    ANESTHESIA: General    ANESTHESIA STAFF:  Anesthesiologist: Virgil Disla MD  CRNA: Ej Castaneda CRNA  Student Nurse Anesthetist: Ernie Delgado SRNA    ESTIMATED BLOOD LOSS:<500ml  SPECIMEN:   ID Type Source Tests Collected by Time   A : lesion from cervix Tissue Cervix TISSUE PATHOLOGY EXAM John Rooney MD 4/13/2021 1229   B (Not marked as sent) : IUD Hardware / Foreign Body Uterus TISSUE PATHOLOGY EXAM John Rooney MD 4/13/2021 1301       FINDINGS: Mass protruding per cervix on frozen section was fibroid       COMPLICATIONS: None noted    DESCRIPTION OF OPERATION: After satisfactory general anesthesia was obtained patient was prepped and draped in modified lithotomy position in [facility].  Timeout performed.  The rounded lesion was seen coming through the os it was felt to be pedunculated it was grasped with ring forceps and removed in a twisting fashion.  There was relatively brisk bleeding after this hysteroscopy was performed IUD in place.  No localized bleeding could be found except bleeding from posterior cervix which was treated with nitro stick with some effect.  I went ahead and reviewed remove the IUD to try to look for any higher areas of bleeding and could find none.  Consideration then  given for possible hysterectomy I sent the lesion for frozen section in the interim pressure was applied in a bimanual fashion and the bleeding decreased considerably reviewed frozen section with Dr. Martinez it was felt to be a probable fibroid.  The bleeding was then reviewed from the cervix uterus and it was minimal decision to watch an observation bed.  I reviewed the clinical situation through the language line with the patient's significant other as he uses American sign language        This document has been electronically signed by John Rooney MD on April 13, 2021 16:48 CDT      Please note that portions of this note were completed with a voice recognition program.

## 2021-04-13 NOTE — ANESTHESIA POSTPROCEDURE EVALUATION
Patient: Viridiana Mota    Procedure Summary     Date: 04/13/21 Room / Location: [facility] OR [address] [place] [address] OR    Anesthesia Start: 1203 Anesthesia Stop: 1324    Procedure: EXAMINATION UNDER ANESTHESIA HYSTEROSCOPY, IUD removal, removal of cervical lesion (N/A Vagina) Diagnosis:       Cervical mass      (Cervical mass [N88.8])    Surgeons: John Rooney MD Provider: Virgil Disla MD    Anesthesia Type: general ASA Status: 3          Anesthesia Type: general    Vitals  Vitals Value Taken Time   BP 92/57 04/13/21 1316   Temp 97.2 °F (36.2 °C) 04/13/21 1316   Pulse 66 04/13/21 1316   Resp 16 04/13/21 1316   SpO2 100 % 04/13/21 1316           Post Anesthesia Care and Evaluation    Patient location during evaluation: PACU  Patient participation: complete - patient participated  Level of consciousness: sleepy but conscious  Pain score: 0  Pain management: adequate  Airway patency: patent  Anesthetic complications: No anesthetic complications  PONV Status: none  Cardiovascular status: acceptable  Respiratory status: acceptable  Hydration status: acceptable    Comments: ---------------------------               04/13/21                      1316         ---------------------------   BP:           92/57         Pulse:         66           Resp:          16           Temp:   97.2 °F (36.2 °C)   SpO2:         100%         ---------------------------

## 2021-04-13 NOTE — PLAN OF CARE
Goal Outcome Evaluation:  Plan of Care Reviewed With: patient  Progress: improving  Outcome Summary: VSS, light vaginal bleeding, pain controlled, tolerating po, voided

## 2021-04-13 NOTE — BRIEF OP NOTE
HYSTEROSCOPY  Progress Note    Viridiana Mota  4/13/2021    Pre-op Diagnosis:   Cervical mass [N88.8] menorrhagia       Post-Op Diagnosis Codes:     * Cervical mass [N88.8] menorrhagia; fibroid delivery per cervix             Procedure(s):  EXAMINATION UNDER ANESTHESIA HYSTEROSCOPY, hysteroscopy with D&C; removal of IUD; removal of fibroid    Surgeon(s):  John Rooney MD    Anesthesia: General    Staff:   Circulator: Laura Smith RN; Wilver Xiong RN  Scrub Person: Jennifer Wilburn  Assistant: Aleisha Norton CSA  Assistant: Aleisha Norton CSA      Estimated Blood Loss: <500ml    Urine Voided: * No values recorded between 4/13/2021 12:02 PM and 4/13/2021  1:15 PM *    Specimens:                Specimens     ID Source Type Tests Collected By Collected At Frozen?    A Cervix Tissue · TISSUE PATHOLOGY EXAM   John Rooney MD 4/13/21 1229 Yes    Description: lesion from cervix    B Uterus Hardware / Foreign Body · TISSUE PATHOLOGY EXAM   John Rooney MD 4/13/21 1301     This specimen was not marked as sent.                Drains: * No LDAs found *    Findings: Mass protruding per cervix on frozen section was fibroid    Complications: Significant bleeding post removal of the lesion.  This responded to bimanual pressure we will plan to observe overnight    Assistant: Aleisha Norton CSA  was responsible for performing the following activities: Retraction and Suction and their skilled assistance was necessary for the success of this case.    John Rooney MD     Date: 4/13/2021  Time: 13:26 CDT

## 2021-04-14 VITALS
TEMPERATURE: 97.9 F | HEIGHT: 60 IN | BODY MASS INDEX: 29.22 KG/M2 | SYSTOLIC BLOOD PRESSURE: 103 MMHG | OXYGEN SATURATION: 98 % | RESPIRATION RATE: 18 BRPM | WEIGHT: 148.81 LBS | DIASTOLIC BLOOD PRESSURE: 65 MMHG | HEART RATE: 79 BPM

## 2021-04-14 LAB
ANION GAP SERPL CALCULATED.3IONS-SCNC: 5 MMOL/L (ref 5–15)
BUN SERPL-MCNC: 15 MG/DL (ref 6–20)
BUN/CREAT SERPL: 20.5 (ref 7–25)
CALCIUM SPEC-SCNC: 8.3 MG/DL (ref 8.6–10.5)
CHLORIDE SERPL-SCNC: 108 MMOL/L (ref 98–107)
CO2 SERPL-SCNC: 27 MMOL/L (ref 22–29)
CREAT SERPL-MCNC: 0.73 MG/DL (ref 0.57–1)
DEPRECATED RDW RBC AUTO: 55.5 FL (ref 37–54)
ERYTHROCYTE [DISTWIDTH] IN BLOOD BY AUTOMATED COUNT: 21.4 % (ref 12.3–15.4)
GFR SERPL CREATININE-BSD FRML MDRD: 106 ML/MIN/1.73
GFR SERPL CREATININE-BSD FRML MDRD: 88 ML/MIN/1.73
GLUCOSE SERPL-MCNC: 117 MG/DL (ref 65–99)
HCT VFR BLD AUTO: 29.4 % (ref 34–46.6)
HGB BLD-MCNC: 8.7 G/DL (ref 12–15.9)
MCH RBC QN AUTO: 21.4 PG (ref 26.6–33)
MCHC RBC AUTO-ENTMCNC: 29.6 G/DL (ref 31.5–35.7)
MCV RBC AUTO: 72.2 FL (ref 79–97)
PLATELET # BLD AUTO: 378 10*3/MM3 (ref 140–450)
PMV BLD AUTO: 9.4 FL (ref 6–12)
POTASSIUM SERPL-SCNC: 4.1 MMOL/L (ref 3.5–5.2)
RBC # BLD AUTO: 4.07 10*6/MM3 (ref 3.77–5.28)
SODIUM SERPL-SCNC: 140 MMOL/L (ref 136–145)
WBC # BLD AUTO: 6.58 10*3/MM3 (ref 3.4–10.8)

## 2021-04-14 PROCEDURE — 85027 COMPLETE CBC AUTOMATED: CPT | Performed by: OBSTETRICS & GYNECOLOGY

## 2021-04-14 PROCEDURE — 80048 BASIC METABOLIC PNL TOTAL CA: CPT | Performed by: OBSTETRICS & GYNECOLOGY

## 2021-04-14 RX ORDER — PROMETHAZINE HYDROCHLORIDE 12.5 MG/1
12.5 TABLET ORAL EVERY 6 HOURS PRN
Qty: 30 TABLET | Refills: 0 | Status: SHIPPED | OUTPATIENT
Start: 2021-04-14 | End: 2021-05-17

## 2021-04-14 RX ORDER — PANTOPRAZOLE SODIUM 40 MG/1
40 TABLET, DELAYED RELEASE ORAL
Status: DISCONTINUED | OUTPATIENT
Start: 2021-04-14 | End: 2021-04-14 | Stop reason: HOSPADM

## 2021-04-14 RX ORDER — FERROUS SULFATE 325(65) MG
325 TABLET ORAL 2 TIMES DAILY
Qty: 60 TABLET | Refills: 3 | Status: SHIPPED | OUTPATIENT
Start: 2021-04-14 | End: 2021-05-17 | Stop reason: ALTCHOICE

## 2021-04-14 RX ORDER — AMOXICILLIN AND CLAVULANATE POTASSIUM 875; 125 MG/1; MG/1
1 TABLET, FILM COATED ORAL EVERY 12 HOURS
Qty: 10 TABLET | Refills: 0 | Status: SHIPPED | OUTPATIENT
Start: 2021-04-14 | End: 2021-04-19

## 2021-04-14 RX ORDER — OXYCODONE HYDROCHLORIDE 5 MG/1
5 TABLET ORAL EVERY 4 HOURS PRN
Qty: 12 TABLET | Refills: 0 | Status: SHIPPED | OUTPATIENT
Start: 2021-04-14 | End: 2021-04-20

## 2021-04-14 RX ADMIN — ACETAMINOPHEN 1000 MG: 500 TABLET, FILM COATED ORAL at 01:08

## 2021-04-14 RX ADMIN — OXYCODONE 5 MG: 5 TABLET ORAL at 04:40

## 2021-04-14 RX ADMIN — IBUPROFEN 800 MG: 800 TABLET, FILM COATED ORAL at 05:30

## 2021-04-14 RX ADMIN — POLYETHYLENE GLYCOL 3350 17 G: 17 POWDER, FOR SOLUTION ORAL at 08:31

## 2021-04-14 RX ADMIN — ACETAMINOPHEN 1000 MG: 500 TABLET, FILM COATED ORAL at 08:31

## 2021-04-14 RX ADMIN — PANTOPRAZOLE SODIUM 40 MG: 40 TABLET, DELAYED RELEASE ORAL at 08:31

## 2021-04-14 RX ADMIN — OXYCODONE 5 MG: 5 TABLET ORAL at 08:53

## 2021-04-14 NOTE — DISCHARGE SUMMARY
[facility]  Viridiana Mota  : 1980  MRN: [id number]  CSN: [id number]    Discharge Summary      Date of Admission: 2021   Date of Discharge: 2021   Discharge Diagnosis:  Cervical mass [N88.8]menorrhagia fibroid delivering per cervical os   Procedures Performed: Procedure(s):  EXAMINATION UNDER ANESTHESIA HYSTEROSCOPY, IUD removal, removal of cervical lesion      Brief History: Patient is a 41 y.o.who presented with a long history over the last year of menorrhagia with secondary anemia.  IUD had been placed with some response.  Was recently found to have a mass protruding per cervix.  Ultrasound was indeterminate nature of mass initially thought to be fibroid did not have characteristics of polyp.  Patient was brought to the operating room and the mass was removed.  There was fairly brisk bleeding after removal of the mass.  IUD was removed to try and a certain etiology of bleeding with hysteroscope.  Frozen section was done to look for malignancy.  I reviewed slides with Dr. Martinez and it seemed to be a fibroid.  Eventually bleeding responded to bimanual compression.  Patient was watched overnight to make sure the bleeding did not restart.  Overnight she had only 1 partially soaked pad.  Her hemoglobin was 8.7 last hemoglobin about 3 weeks ago had been 9.8..        Pending Studies: Tissue Pathology   Condition at Discharge: Stable   Discharge Diet:  Increased iron   Discharge Activity:  Pelvic rest no heavy lifting   Discharge Medications:    Your medication list      START taking these medications      Instructions Last Dose Given Next Dose Due   amoxicillin-clavulanate 875-125 MG per tablet  Commonly known as: Augmentin      Take 1 tablet by mouth Every 12 (Twelve) Hours for 5 days.       ferrous sulfate 325 (65 FE) MG tablet      Take 1 tablet by mouth 2 (two) times a day.       oxyCODONE 5 MG immediate release tablet  Commonly known as: ROXICODONE      Take 1 tablet by mouth Every 4  (Four) Hours As Needed for Moderate Pain  for up to 6 days.       promethazine 12.5 MG tablet  Commonly known as: PHENERGAN      Take 1 tablet by mouth Every 6 (Six) Hours As Needed for Nausea or Vomiting.          CONTINUE taking these medications      Instructions Last Dose Given Next Dose Due   celecoxib 100 MG capsule  Commonly known as: CeleBREX      Take 1 capsule by mouth 2 (Two) Times a Day As Needed for Mild Pain .       Magnesium 200 MG tablet      Take 1 tablet by mouth Daily.       omeprazole 40 MG capsule  Commonly known as: priLOSEC      Take 1 capsule by mouth Daily.       pediatric multivitamin-iron 10 MG/ML drops      Take 1 mL by mouth Daily.       venlafaxine XR 75 MG 24 hr capsule  Commonly known as: EFFEXOR-XR      Take 75 mg by mouth Every Night.       Vitamin D3 10 MCG (400 UNIT) capsule      Take 1 capsule by mouth Daily.             Where to Get Your Medications      These medications were sent to [facility] [place] [id number]    Hours: Monday through Friday 7:00am to 5:00pm Phone: [phone] ·   amoxicillin-clavulanate 875-125 MG per tablet  · ferrous sulfate 325 (65 FE) MG tablet  · oxyCODONE 5 MG immediate release tablet  · promethazine 12.5 MG tablet        Discharge Disposition: home   Follow-up: Future Appointments   Date Time Provider Department Center   7/7/2021  1:15 PM Lane Rod MD [facility]            This note has been electronically signed.    John Rooney MD  April 14, 2021  07:10 CDT

## 2021-04-14 NOTE — PLAN OF CARE
Problem: Adult Inpatient Plan of Care  Goal: Plan of Care Review  Outcome: Ongoing, Progressing  Flowsheets (Taken 4/14/2021 0350)  Progress: improving  Plan of Care Reviewed With: patient  Outcome Summary: Ambulating to bathroom, has been resting between care.   Goal Outcome Evaluation:  Plan of Care Reviewed With: patient  Progress: improving  Outcome Summary: Ambulating to bathroom, has been resting between care.

## 2021-04-15 LAB
LAB AP CASE REPORT: NORMAL
PATH REPORT.FINAL DX SPEC: NORMAL

## 2021-05-17 ENCOUNTER — OFFICE VISIT (OUTPATIENT)
Dept: FAMILY MEDICINE CLINIC | Facility: CLINIC | Age: 41
End: 2021-05-17

## 2021-05-17 VITALS
HEART RATE: 92 BPM | BODY MASS INDEX: 29.09 KG/M2 | SYSTOLIC BLOOD PRESSURE: 124 MMHG | OXYGEN SATURATION: 97 % | DIASTOLIC BLOOD PRESSURE: 88 MMHG | TEMPERATURE: 97.7 F | WEIGHT: 148.2 LBS | HEIGHT: 60 IN

## 2021-05-17 DIAGNOSIS — Z87.19 H/O GASTROESOPHAGEAL REFLUX (GERD): ICD-10-CM

## 2021-05-17 DIAGNOSIS — Z82.61 FAMILY HISTORY OF RHEUMATOID ARTHRITIS: ICD-10-CM

## 2021-05-17 DIAGNOSIS — G89.29 CHRONIC BILATERAL LOW BACK PAIN WITH LEFT-SIDED SCIATICA: ICD-10-CM

## 2021-05-17 DIAGNOSIS — M19.90 ARTHRITIS: ICD-10-CM

## 2021-05-17 DIAGNOSIS — M54.42 CHRONIC BILATERAL LOW BACK PAIN WITH LEFT-SIDED SCIATICA: ICD-10-CM

## 2021-05-17 PROCEDURE — 99214 OFFICE O/P EST MOD 30 MIN: CPT | Performed by: FAMILY MEDICINE

## 2021-05-17 RX ORDER — MULTIPLE VITAMINS W/ MINERALS TAB 9MG-400MCG
1 TAB ORAL DAILY
COMMUNITY

## 2021-05-17 RX ORDER — PREDNISONE 10 MG/1
10 TABLET ORAL SEE ADMIN INSTRUCTIONS
Qty: 17 TABLET | Refills: 0 | Status: SHIPPED | OUTPATIENT
Start: 2021-05-17 | End: 2021-06-02

## 2021-05-17 NOTE — PROGRESS NOTES
Chief Complaint  Depression, Arthritis, and Fatigue  Pain 6-7/10  Subjective          Review of Systems   Constitutional: Positive for fatigue. Negative for activity change, appetite change, chills, diaphoresis, fever and unexpected weight change.   HENT: Negative for congestion, dental problem, drooling, ear discharge, ear pain, facial swelling, hearing loss, mouth sores, nosebleeds, postnasal drip, rhinorrhea, sinus pressure, sinus pain, sneezing, sore throat, tinnitus, trouble swallowing and voice change.    Eyes: Negative for photophobia, pain, discharge, redness, itching and visual disturbance.          Astigmatism.   Respiratory: Negative for apnea, cough, choking, chest tightness, shortness of breath, wheezing and stridor.         1 cig/day   Cardiovascular: Negative for chest pain, palpitations and leg swelling.   Gastrointestinal: Positive for abdominal pain. Negative for abdominal distention, anal bleeding, blood in stool, bowel incontinence, constipation, diarrhea, nausea, rectal pain and vomiting.   Endocrine: Negative for cold intolerance, heat intolerance, polydipsia, polyphagia and polyuria.   Genitourinary: Positive for bladder incontinence, enuresis and menstrual problem. Negative for decreased urine volume, difficulty urinating, dyspareunia, dysuria, flank pain, frequency, genital sores, hematuria, pelvic pain, urgency, vaginal bleeding, vaginal discharge and vaginal pain.        Heavy bleeding resolved after Mirena   Musculoskeletal: Positive for arthralgias, arthritis, back pain, joint swelling and myalgias. Negative for gait problem, neck pain and neck stiffness.        Low back pain down L buttock   Skin: Negative for color change, pallor, rash and wound.   Allergic/Immunologic: Negative for environmental allergies, food allergies and immunocompromised state.   Neurological: Negative for dizziness, tingling, tremors, seizures, syncope, facial asymmetry, speech difficulty, weakness,  light-headedness, numbness, headaches and paresthesias.   Hematological: Negative for adenopathy. Does not bruise/bleed easily.   Psychiatric/Behavioral: Positive for dysphoric mood and sleep disturbance. Negative for agitation, behavioral problems, confusion, decreased concentration, hallucinations, self-injury and suicidal ideas. The patient is nervous/anxious. The patient is not hyperactive.          Sleeps usually 4-6hrs.       Viridiana Mota presents to [facility]  Depression  Visit Type: follow-up  Patient presents with the following symptoms: nervousness/anxiety.  Patient is not experiencing: choking sensation, confusion, decreased concentration, palpitations, shortness of breath and suicidal ideas.    Arthritis  Presents for follow-up visit. She complains of joint swelling. Her pain is at a severity of 6/10. Associated symptoms include fatigue. Pertinent negatives include no diarrhea, dysuria, fever or rash.   Fatigue  This is a chronic problem. The current episode started more than 1 year ago. The problem occurs daily. Associated symptoms include abdominal pain, arthralgias, fatigue, joint swelling and myalgias. Pertinent negatives include no chest pain, chills, congestion, coughing, diaphoresis, fever, headaches, nausea, neck pain, numbness, rash, sore throat, vomiting or weakness. She has tried acetaminophen and NSAIDs for the symptoms. The treatment provided moderate relief.   Back Pain  This is a chronic problem. The current episode started more than 1 year ago. The problem occurs constantly. The problem has been waxing and waning since onset. The pain is present in the lumbar spine and sacro-iliac. The quality of the pain is described as aching and stabbing. The pain radiates to the right thigh. The pain is at a severity of 6/10. The pain is the same all the time. The symptoms are aggravated by bending, position and twisting. Stiffness is present all day. Associated  "symptoms include abdominal pain, bladder incontinence and leg pain. Pertinent negatives include no bowel incontinence, chest pain, dysuria, fever, headaches, numbness, paresis, paresthesias, pelvic pain, perianal numbness, tingling or weakness. She has tried analgesics, NSAIDs and muscle relaxant for the symptoms. The treatment provided mild relief.       Objective   Vital Signs:   /88 (BP Location: Left arm, Patient Position: Sitting, Cuff Size: Adult)   Pulse 92   Temp 97.7 °F (36.5 °C) (Temporal)   Ht 152.4 cm (60\")   Wt 67.2 kg (148 lb 3.2 oz)   SpO2 97%   BMI 28.94 kg/m²     Physical Exam  Vitals and nursing note reviewed.   Constitutional:       Appearance: Normal appearance. She is well-developed.   HENT:      Head: Normocephalic and atraumatic.      Right Ear: Tympanic membrane, ear canal and external ear normal.      Left Ear: Tympanic membrane, ear canal and external ear normal.      Nose: Nose normal.      Mouth/Throat:      Mouth: Mucous membranes are moist.      Pharynx: Oropharynx is clear.   Eyes:      Extraocular Movements: Extraocular movements intact.      Conjunctiva/sclera: Conjunctivae normal.      Pupils: Pupils are equal, round, and reactive to light.   Cardiovascular:      Rate and Rhythm: Normal rate and regular rhythm.      Heart sounds: Normal heart sounds.   Pulmonary:      Effort: Pulmonary effort is normal.      Breath sounds: Normal breath sounds.   Abdominal:      General: Bowel sounds are normal. There is no distension.      Palpations: Abdomen is soft.      Tenderness: There is no abdominal tenderness.   Musculoskeletal:         General: Tenderness present.      Cervical back: Normal range of motion and neck supple.      Comments: WB 6 with ROM of knees, back, hands.   Skin:     General: Skin is warm and dry.   Neurological:      Mental Status: She is alert and oriented to person, place, and time.      Cranial Nerves: No cranial nerve deficit.      Sensory: No sensory " deficit.      Motor: No weakness.      Coordination: Coordination normal.      Gait: Gait normal.      Deep Tendon Reflexes: Reflexes normal.   Psychiatric:         Mood and Affect: Mood normal.         Speech: Speech normal.         Behavior: Behavior normal.         Thought Content: Thought content normal.         Judgment: Judgment normal.        Result Review :                 Assessment and Plan    Diagnoses and all orders for this visit:    1. Family history of rheumatoid arthritis  -     JENA by IFA, Reflex 9-biomarkers profile; Future  -     Cyclic Citrul Peptide Antibody, IgG / IgA  -     Rheumatoid Factor, Quant; Future    2. Arthritis  -     predniSONE (DELTASONE) 10 MG tablet; Take 1 tablet by mouth See Admin Instructions. Take 1 Tab Tid x3 days, Then 1 Tab Bid x 2 days Then 1 Tab QD x4 days,then D/C  Dispense: 17 tablet; Refill: 0  -     celecoxib (CeleBREX) 100 MG capsule; Take 1 capsule by mouth 2 (Two) Times a Day As Needed for Mild Pain .  Dispense: 60 capsule; Refill: 1    3. H/O gastroesophageal reflux (GERD)  -     omeprazole (priLOSEC) 40 MG capsule; Take 1 capsule by mouth Daily.  Dispense: 30 capsule; Refill: 1    4. Chronic bilateral low back pain with left-sided sciatica  -     predniSONE (DELTASONE) 10 MG tablet; Take 1 tablet by mouth See Admin Instructions. Take 1 Tab Tid x3 days, Then 1 Tab Bid x 2 days Then 1 Tab QD x4 days,then D/C  Dispense: 17 tablet; Refill: 0    Other orders  -     Magnesium 200 MG tablet; Take 1 tablet by mouth Daily.  Dispense: 30 each; Refill: 1        Follow Up   Return in about 2 weeks (around 5/31/2021).  Patient was given instructions and counseling regarding her condition or for health maintenance advice. Please see specific information pulled into the AVS if appropriate.         This document has been electronically signed by Lane Rod MD         Answers for HPI/ROS submitted by the patient on 5/16/2021  What is the primary reason for your  visit?: Back Pain

## 2021-05-18 ENCOUNTER — LAB (OUTPATIENT)
Dept: LAB | Facility: HOSPITAL | Age: 41
End: 2021-05-18

## 2021-05-18 DIAGNOSIS — Z82.61 FAMILY HISTORY OF RHEUMATOID ARTHRITIS: ICD-10-CM

## 2021-05-18 PROBLEM — G89.29 CHRONIC BILATERAL LOW BACK PAIN WITH LEFT-SIDED SCIATICA: Status: ACTIVE | Noted: 2021-05-18

## 2021-05-18 PROBLEM — M54.42 CHRONIC BILATERAL LOW BACK PAIN WITH LEFT-SIDED SCIATICA: Status: ACTIVE | Noted: 2021-05-18

## 2021-05-18 PROCEDURE — 86038 ANTINUCLEAR ANTIBODIES: CPT

## 2021-05-18 PROCEDURE — 86431 RHEUMATOID FACTOR QUANT: CPT

## 2021-05-18 PROCEDURE — 86200 CCP ANTIBODY: CPT | Performed by: FAMILY MEDICINE

## 2021-05-18 RX ORDER — CELECOXIB 100 MG/1
100 CAPSULE ORAL 2 TIMES DAILY PRN
Qty: 60 CAPSULE | Refills: 1 | Status: SHIPPED | OUTPATIENT
Start: 2021-05-18 | End: 2021-06-02 | Stop reason: SDUPTHER

## 2021-05-18 RX ORDER — OMEPRAZOLE 40 MG/1
40 CAPSULE, DELAYED RELEASE ORAL DAILY
Qty: 30 CAPSULE | Refills: 1 | Status: SHIPPED | OUTPATIENT
Start: 2021-05-18 | End: 2021-06-02 | Stop reason: SDUPTHER

## 2021-05-18 RX ORDER — MAGNESIUM 200 MG
1 TABLET ORAL DAILY
Qty: 30 EACH | Refills: 1 | Status: SHIPPED | OUTPATIENT
Start: 2021-05-18 | End: 2021-06-02 | Stop reason: SDUPTHER

## 2021-05-19 LAB — CHROMATIN AB SERPL-ACNC: <10 IU/ML (ref 0–14)

## 2021-05-20 LAB — CCP IGA+IGG SERPL IA-ACNC: 5 UNITS (ref 0–19)

## 2021-05-21 LAB
ANA TITR SER IF: NEGATIVE {TITER}
LABORATORY COMMENT REPORT: NORMAL

## 2021-05-23 ENCOUNTER — PATIENT MESSAGE (OUTPATIENT)
Dept: FAMILY MEDICINE CLINIC | Facility: CLINIC | Age: 41
End: 2021-05-23

## 2021-05-24 ENCOUNTER — TELEPHONE (OUTPATIENT)
Dept: OBSTETRICS AND GYNECOLOGY | Facility: CLINIC | Age: 41
End: 2021-05-24

## 2021-05-24 RX ORDER — VENLAFAXINE HYDROCHLORIDE 75 MG/1
75 CAPSULE, EXTENDED RELEASE ORAL NIGHTLY
Qty: 30 CAPSULE | Refills: 0 | Status: SHIPPED | OUTPATIENT
Start: 2021-05-24 | End: 2021-06-02 | Stop reason: SDUPTHER

## 2021-05-24 NOTE — TELEPHONE ENCOUNTER
From: Viridiana Mota  To: Lane Rod MD  Sent: 5/23/2021 7:28 PM CDT  Subject: Prescription Question    Hello,   I think I forgot to check 'refill' for my venlafaxine and I was wondering if you could send that in, I am almost out. And by almost out, I have one day left after this evening. Sorry and thanks. Hope you have a wonderful day.

## 2021-05-24 NOTE — TELEPHONE ENCOUNTER
[facility] called and asked for another order so she can be seen with Tianna Lee next week in [place].

## 2021-05-25 DIAGNOSIS — N39.3 STRESS INCONTINENCE IN FEMALE: Primary | ICD-10-CM

## 2021-05-27 ENCOUNTER — TELEPHONE (OUTPATIENT)
Dept: FAMILY MEDICINE CLINIC | Facility: CLINIC | Age: 41
End: 2021-05-27

## 2021-05-27 NOTE — TELEPHONE ENCOUNTER
----- Message from Lane Rod MD sent at 5/21/2021  8:09 AM CDT -----  Test for Rheumatoid  are negative

## 2021-06-01 ENCOUNTER — APPOINTMENT (OUTPATIENT)
Dept: PHYSICAL THERAPY | Facility: HOSPITAL | Age: 41
End: 2021-06-01

## 2021-06-02 ENCOUNTER — OFFICE VISIT (OUTPATIENT)
Dept: FAMILY MEDICINE CLINIC | Facility: CLINIC | Age: 41
End: 2021-06-02

## 2021-06-02 VITALS
DIASTOLIC BLOOD PRESSURE: 82 MMHG | OXYGEN SATURATION: 98 % | WEIGHT: 147.4 LBS | BODY MASS INDEX: 28.94 KG/M2 | SYSTOLIC BLOOD PRESSURE: 120 MMHG | HEIGHT: 60 IN | HEART RATE: 115 BPM | TEMPERATURE: 97.7 F

## 2021-06-02 DIAGNOSIS — N92.1 MENORRHAGIA WITH IRREGULAR CYCLE: ICD-10-CM

## 2021-06-02 DIAGNOSIS — Z87.19 H/O GASTROESOPHAGEAL REFLUX (GERD): ICD-10-CM

## 2021-06-02 DIAGNOSIS — F32.A DEPRESSION, UNSPECIFIED DEPRESSION TYPE: ICD-10-CM

## 2021-06-02 DIAGNOSIS — G89.29 CHRONIC BILATERAL LOW BACK PAIN WITH LEFT-SIDED SCIATICA: ICD-10-CM

## 2021-06-02 DIAGNOSIS — M19.90 ARTHRITIS: ICD-10-CM

## 2021-06-02 DIAGNOSIS — M54.42 CHRONIC BILATERAL LOW BACK PAIN WITH LEFT-SIDED SCIATICA: ICD-10-CM

## 2021-06-02 PROCEDURE — 99214 OFFICE O/P EST MOD 30 MIN: CPT | Performed by: FAMILY MEDICINE

## 2021-06-02 RX ORDER — PREDNISONE 10 MG/1
10 TABLET ORAL SEE ADMIN INSTRUCTIONS
Qty: 24 TABLET | Refills: 0 | Status: SHIPPED | OUTPATIENT
Start: 2021-06-02 | End: 2021-06-29

## 2021-06-02 RX ORDER — OMEPRAZOLE 40 MG/1
40 CAPSULE, DELAYED RELEASE ORAL DAILY
Qty: 30 CAPSULE | Refills: 1 | Status: SHIPPED | OUTPATIENT
Start: 2021-06-02 | End: 2021-06-29 | Stop reason: SDUPTHER

## 2021-06-02 RX ORDER — MAGNESIUM 200 MG
1 TABLET ORAL DAILY
Qty: 30 EACH | Refills: 1 | Status: SHIPPED | OUTPATIENT
Start: 2021-06-02 | End: 2021-06-29 | Stop reason: SDUPTHER

## 2021-06-02 RX ORDER — VENLAFAXINE HYDROCHLORIDE 75 MG/1
75 CAPSULE, EXTENDED RELEASE ORAL NIGHTLY
Qty: 30 CAPSULE | Refills: 1 | Status: SHIPPED | OUTPATIENT
Start: 2021-06-02 | End: 2021-06-29 | Stop reason: SDUPTHER

## 2021-06-02 RX ORDER — CELECOXIB 100 MG/1
100 CAPSULE ORAL 2 TIMES DAILY PRN
Qty: 60 CAPSULE | Refills: 1 | Status: SHIPPED | OUTPATIENT
Start: 2021-06-02 | End: 2021-06-29 | Stop reason: SDUPTHER

## 2021-06-02 NOTE — PROGRESS NOTES
Chief Complaint  Back Pain, Hip Pain, Joint Pain, and Menstrual Problem (Heavy bleeding again since Mirena out)     Subjective          Review of Systems   Constitutional: Positive for fatigue. Negative for activity change, appetite change, chills, diaphoresis, fever and unexpected weight change.   HENT: Negative for congestion, dental problem, drooling, ear discharge, ear pain, facial swelling, hearing loss, mouth sores, nosebleeds, postnasal drip, rhinorrhea, sinus pressure, sinus pain, sneezing, sore throat, tinnitus, trouble swallowing and voice change.    Eyes: Negative for photophobia, pain, discharge, redness, itching and visual disturbance.          Astigmatism.   Respiratory: Negative for apnea, cough, choking, chest tightness, shortness of breath, wheezing and stridor.         1 cig/day   Cardiovascular: Negative for chest pain, palpitations and leg swelling.   Gastrointestinal: Positive for abdominal pain. Negative for abdominal distention, anal bleeding, blood in stool, bowel incontinence, constipation, diarrhea, nausea, rectal pain and vomiting.   Endocrine: Negative for cold intolerance, heat intolerance, polydipsia, polyphagia and polyuria.   Genitourinary: Positive for bladder incontinence, enuresis, menorrhagia, menstrual problem and vaginal bleeding. Negative for decreased urine volume, difficulty urinating, dyspareunia, dysuria, flank pain, frequency, genital sores, hematuria, pelvic pain, urgency, vaginal discharge and vaginal pain.        Heavy bleeding recurrent after Mirena out.     Musculoskeletal: Positive for arthritis and back pain. Negative for gait problem, neck pain and neck stiffness.        Low back pain down L buttock   Skin: Negative for color change, pallor, rash and wound.   Allergic/Immunologic: Negative for environmental allergies, food allergies and immunocompromised state.   Neurological: Negative for dizziness, tingling, tremors, seizures, syncope, facial asymmetry, speech  difficulty, weakness, light-headedness, numbness, headaches and paresthesias.   Hematological: Negative for adenopathy. Does not bruise/bleed easily.   Psychiatric/Behavioral: Positive for dysphoric mood and sleep disturbance. Negative for agitation, behavioral problems, confusion, decreased concentration, hallucinations, self-injury and suicidal ideas. The patient is nervous/anxious. The patient is not hyperactive.          Sleeps usually 4-6hrs.       Viridiana Mota presents to [facility]  Depression  Visit Type: follow-up  Patient presents with the following symptoms: nervousness/anxiety.  Patient is not experiencing: choking sensation, confusion, decreased concentration, palpitations, shortness of breath and suicidal ideas.    Arthritis  Presents for follow-up visit. Her pain is at a severity of 6/10. Associated symptoms include fatigue. Pertinent negatives include no diarrhea, dysuria, fever or rash.   Fatigue  This is a chronic problem. The current episode started more than 1 year ago. The problem occurs daily. Associated symptoms include abdominal pain and fatigue. Pertinent negatives include no chest pain, chills, congestion, coughing, diaphoresis, fever, headaches, nausea, neck pain, numbness, rash, sore throat, vomiting or weakness. She has tried acetaminophen and NSAIDs for the symptoms. The treatment provided moderate relief.   Back Pain  This is a chronic problem. The current episode started more than 1 year ago. The problem occurs constantly. The problem has been waxing and waning since onset. The pain is present in the lumbar spine and sacro-iliac. The quality of the pain is described as aching and stabbing. The pain radiates to the right thigh. The pain is at a severity of 6/10. The pain is the same all the time. The symptoms are aggravated by bending, position and twisting. Stiffness is present all day. Associated symptoms include abdominal pain, bladder  "incontinence and leg pain. Pertinent negatives include no bowel incontinence, chest pain, dysuria, fever, headaches, numbness, paresis, paresthesias, pelvic pain, perianal numbness, tingling or weakness. She has tried analgesics, NSAIDs and muscle relaxant for the symptoms. The treatment provided mild relief.   Anemia  Presents for follow-up visit. Symptoms include abdominal pain. There has been no bruising/bleeding easily, confusion, fever, light-headedness, pallor, palpitations or paresthesias. Signs of blood loss that are present include menorrhagia and vaginal bleeding.   Menstrual Problem  This is a chronic problem. The current episode started more than 1 year ago. The problem occurs intermittently. The problem has been unchanged. Associated symptoms include abdominal pain and fatigue. Pertinent negatives include no chest pain, chills, congestion, coughing, diaphoresis, fever, headaches, nausea, neck pain, numbness, rash, sore throat, vomiting or weakness. Treatments tried: Mirena helped.       Objective   Vital Signs:   /82 (BP Location: Left arm, Patient Position: Sitting, Cuff Size: Adult)   Pulse 115   Temp 97.7 °F (36.5 °C) (Temporal)   Ht 152.4 cm (60\")   Wt 66.9 kg (147 lb 6.4 oz)   SpO2 98%   BMI 28.79 kg/m²     Physical Exam  Vitals and nursing note reviewed.   Constitutional:       Appearance: Normal appearance. She is well-developed.   HENT:      Head: Normocephalic and atraumatic.      Right Ear: Tympanic membrane, ear canal and external ear normal.      Left Ear: Tympanic membrane, ear canal and external ear normal.      Nose: Nose normal.      Mouth/Throat:      Mouth: Mucous membranes are moist.      Pharynx: Oropharynx is clear.   Eyes:      Extraocular Movements: Extraocular movements intact.      Conjunctiva/sclera: Conjunctivae normal.      Pupils: Pupils are equal, round, and reactive to light.   Cardiovascular:      Rate and Rhythm: Normal rate and regular rhythm.      Heart " sounds: Normal heart sounds.   Pulmonary:      Effort: Pulmonary effort is normal.      Breath sounds: Normal breath sounds.   Abdominal:      General: Bowel sounds are normal. There is no distension.      Palpations: Abdomen is soft.      Tenderness: There is no abdominal tenderness.   Musculoskeletal:         General: Tenderness present.      Cervical back: Normal range of motion and neck supple.      Comments: WB 6 with ROM of knees, back, hands.   Skin:     General: Skin is warm and dry.   Neurological:      Mental Status: She is alert and oriented to person, place, and time.      Cranial Nerves: No cranial nerve deficit.      Sensory: No sensory deficit.      Motor: No weakness.      Coordination: Coordination normal.      Gait: Gait normal.      Deep Tendon Reflexes: Reflexes normal.   Psychiatric:         Mood and Affect: Mood normal.         Speech: Speech normal.         Behavior: Behavior normal.         Thought Content: Thought content normal.         Judgment: Judgment normal.        Result Review :                 Assessment and Plan    Diagnoses and all orders for this visit:    1. Arthritis  -     predniSONE (DELTASONE) 10 MG tablet; Take 1 tablet by mouth See Admin Instructions. Take 1 Tab Tid x 5 days, Then 1 Tab Bid x 3 days Then 1 Tab QD x3 days,then D/C  Dispense: 24 tablet; Refill: 0  -     celecoxib (CeleBREX) 100 MG capsule; Take 1 capsule by mouth 2 (Two) Times a Day As Needed for Mild Pain .  Dispense: 60 capsule; Refill: 1  -     Magnesium 200 MG tablet; Take 1 tablet by mouth Daily.  Dispense: 30 each; Refill: 1    2. H/O gastroesophageal reflux (GERD)  -     omeprazole (priLOSEC) 40 MG capsule; Take 1 capsule by mouth Daily.  Dispense: 30 capsule; Refill: 1    3. Menorrhagia with irregular cycle  Comments:  Will F/U with OB/Gyn    4. Chronic bilateral low back pain with left-sided sciatica    5. Depression, unspecified depression type  -     venlafaxine XR (EFFEXOR-XR) 75 MG 24 hr  capsule; Take 1 capsule by mouth Every Night.  Dispense: 30 capsule; Refill: 1        Follow Up   Return in about 4 weeks (around 6/30/2021).  Patient was given instructions and counseling regarding her condition or for health maintenance advice. Please see specific information pulled into the AVS if appropriate.         This document has been electronically signed by Lane Rod MD

## 2021-06-10 ENCOUNTER — HOSPITAL ENCOUNTER (OUTPATIENT)
Dept: PHYSICAL THERAPY | Facility: HOSPITAL | Age: 41
Setting detail: THERAPIES SERIES
Discharge: HOME | End: 2021-06-10

## 2021-06-10 DIAGNOSIS — N39.3 SUI (STRESS URINARY INCONTINENCE, FEMALE): Primary | ICD-10-CM

## 2021-06-10 PROCEDURE — 97162 PT EVAL MOD COMPLEX 30 MIN: CPT | Performed by: PHYSICAL THERAPIST

## 2021-06-10 NOTE — THERAPY EVALUATION
Outpatient Physical Therapy Pelvic Health Initial Evaluation  [facility]     Patient Name: Viridiana Mota  : 1980  MRN: [id number]  Today's Date: 6/10/2021        Visit Date: 06/10/2021  Visit number: 1 out of 1  Reassessment date 2021  Insurance: "[employer]"    Patient Active Problem List   Diagnosis   • Closed fracture of base of fifth metacarpal bone   • Tick bite   • Arthritis   • Encounter to establish care with new doctor   • Depression   • Menorrhagia with irregular cycle   • Fatigue   • Cervical mass   • H/O gastroesophageal reflux (GERD)   • Family history of rheumatoid arthritis   • Chronic bilateral low back pain with left-sided sciatica        Past Medical History:   Diagnosis Date   • Anemia    • Anxiety    • Arthritis    • Depression    • Hyperlipidemia         Past Surgical History:   Procedure Laterality Date   • HYSTEROSCOPY N/A 2021    Procedure: EXAMINATION UNDER ANESTHESIA HYSTEROSCOPY, IUD removal, removal of cervical lesion;  Surgeon: John Rooney MD;  Location: [facility];  Service: Obstetrics/Gynecology;  Laterality: N/A;   • LEEP     • TOOTH EXTRACTION       Current Outpatient Medications on File Prior to Encounter   Medication Sig Dispense Refill   • celecoxib (CeleBREX) 100 MG capsule Take 1 capsule by mouth 2 (Two) Times a Day As Needed for Mild Pain . 60 capsule 1   • Cholecalciferol (Vitamin D3) 10 MCG (400 UNIT) capsule Take 1 capsule by mouth Daily.     • Magnesium 200 MG tablet Take 1 tablet by mouth Daily. 30 each 1   • multivitamin with minerals (MULTIVITAMIN WOMEN PO) Take 1 tablet by mouth Daily.     • omeprazole (priLOSEC) 40 MG capsule Take 1 capsule by mouth Daily. 30 capsule 1   • pediatric multivitamin-iron (POLY-VI-SOL with IRON) 10 MG/ML drops Take 1 mL by mouth Daily.     • predniSONE (DELTASONE) 10 MG tablet Take 1 tablet by mouth See Admin Instructions. Take 1 Tab Tid x 5 days, Then 1 Tab Bid x 3 days Then 1 Tab QD x3 days,then D/C 24  tablet 0   • venlafaxine XR (EFFEXOR-XR) 75 MG 24 hr capsule Take 1 capsule by mouth Every Night. 30 capsule 1     No current facility-administered medications on file prior to encounter.     No Known Allergies      Visit Dx:    ICD-10-CM ICD-9-CM   1. NAJMA (stress urinary incontinence, female)  N39.3 625.6           Pelvic Health     Row Name 06/10/21 1300             Pregnancy Questions    Number of Pregnancies  2  -SW      Number of Miscarriages  0  -SW      Has the patient had an ?  No  -SW      Number of Children  -- 7 and 15 yo   -SW      Type of Previous Deliveries  Vaginal episiotomy with first; tear with second  -SW         Pain Assessment    Pain Assessment  0-10 back and joint pain mostly.  R/O OA  -SW      Pain Score  6  -SW      Post Pain Score  6  -SW      Pain Location  Back  -SW         Pelvic Floor Muscle    Patient/Parent/Guardian Consented to Internal Pelvic Floor Exam  Yes  -SW      Strength (Right)  3: Squeeze with/without lift  -SW      Strength (Left)  3: Squeeze with/without lift  -SW      Symmetry of Sustained Maximal Contraction  Symmetrical  -SW      Endurance (Ability to Hold Maximal Contraction)  10 sec  -SW      # of Reps of Maximal Contractions while Maintaining Endurance and Strength  3 sets  -SW      Fast Contraction (# of 1 sec contractions performed)  10  -SW      Internal Pelvic Floor Comments  superficial tenderness noted along entrance of vaginal cuff.  R>L tenderness.  Obt internus hot on R and L sides.  Post pelvic floor thickened with palpational tenderness noted. Ischiocavernosus ttp noted bilateraly R>L.  MMT 3/5 with closure but no lift.  Pt found to contract PF on inhalation of breath.  Urethrocele grade II noted. Slight vaginal atrophy noted.   -SW      External Pelvic Floor Comments  Normal appearance to external perineum.  No lesion, massess or regions of hypopigmentation.   -SW         Observations    Perineal Observation Performed?  Yes  -SW      Posture  Observations  Alert and oriented.  Normal posture in seated position without distress.    -SW         Observation of Contraction in Perineum    Anal [place]  Present  -SW      Perineal Body Lift  Present  -SW         Pelvic Floor    Ability to Isolate Contraction of Pelvic Floor  No  -SW      Overflow from Adjacent Muscles  Upper Abdominals  -SW         Cough    Bulge  Yes  -SW         Prolapse (Pop-Q)    Cystocele  Stage II  -SW      Prolapse Comments  ant translation with valsalva to advance to stage III  -SW         SEMG Evaluation    Pelvic Floor Electrode  Internal Vaginal  -SW      Baseline Resting  Yes  -SW      SEMG Evaluation Comments  Normal baseline noted in supine position to PF.  Able to contract PF with isolation x 6-7 reps prior to s/s of fatigue.  Endurance: able to sustain contraction x 10 sec hold but without proper breath support noted.  Pt has tendancy to contract with inhalation of breath and valsalva.    -SW         Education Provided On:    Education Points  HEP;Behavioral modifications bladder diary  -SW      Method of Delivery  Verbal;Demonstration;Written  -SW      Education Provided To  Patient  -SW      Level of Understanding  Teach back education performed;Verbalized;Demonstrated  -SW      HEP Comments  bladder diary; pelvic rest position x breath training with PF contractions.    -SW         Outcome Measures    Outcome Measure Options  PFDI-20  -SW         Pelvic Organ Prolapse Distress Inventory 6 (POPDI-6)    Do you usually experience pressure in the lower abdomen?  4  -SW      Do you usually experience heaviness or dullness in the pelvic area  4  -SW      Do you usually have a bulge or something falling out that you can see or feel in your vaginal area?  0  -SW      Do you ever have to push on the vagina or around the rectum to have or complete a bowel movement?  0  -SW      Do you usually experience a feeling of incomplete bladder emptying?  0  -SW      Do you ever have to push up on a  bulge in the vaginal area with your fingers to start or complete urination?  0  -SW      POPDI-6 Score  1.33  -SW         Colorectal-Anal Distress Inventory 8 (CRAD-8)    Do you feel you need to strain too hard to have a bowel movement?  4  -SW      Do you feel you have not completely emptied your bowels at the end of a bowel movement?  4  -SW      Do you usually lose stool beyond your control if your stool is well formed?  0  -SW      Do you usually lose stool beyond your control if your stool is loose?  0  -SW      Do you usually lose gas from your rectum beyond your control?  0  -SW      Do you usually have pain when you pass your stool?  4  -SW      Do you experience a strong sense of urgency and have to rush to the bathroom to have a bowel movement  0  -SW      Does part of your bowel ever pass through the rectum and bulge outside during or after a bowel movement?  0  -SW      CRAD-8 Score  1.5  -SW         Urinary Distress Inventory 6 (NATIVIDAD-6)    Do you usually experience frequent urination?  0  -SW      Do you usually experience urine leakage associated with a feeling of urgency, that is, a strong sensation of needing to go to the bathroom?  4  -SW      Do you usually experience urine leakage related to coughing, sneezing, or laughing?  4  -SW      Do you usually experience small amounts of urine leakage(that is, drops)?  4  -SW      Do you usually experience difficulty emptying your bladder?  0  -SW      Do you usually experience pain or discomfort in the lower abdomen or genital region?  4  -SW      NATIVIDAD-6 Score  2.67  -SW         General ROM    GENERAL ROM COMMENTS  functional   -SW         MMT (Manual Muscle Testing)    General MMT Comments  functional   -SW        User Key  (r) = Recorded By, (t) = Taken By, (c) = Cosigned By    Initials Name Provider Type    SW Tianna Lee, PT DPT Physical Therapist        PT Ortho     Row Name 06/10/21 1300       Subjective Comments    Subjective Comments  UI has  just started this past year.  If I sneeze or step too hard I leak.  Void frequency approx 5 per day.  Nocturia 1/night.  No leaks throughout the night.  Wears pads during the dayx 2/day.  Not saturated.  No straining noted with Urination.  Feels emptied with completed.  No pain with urination.  regular flow of urine noted.  Bowels have never moved normally.  Hurts her cervix when she does have BM.  BM 1x/week.  No use of medicaton to assist your bowels. Pt is taking 1 tablet of iron per day due to anemia.  She bled approximately every day for 1 year.  Hysteroscopy found a polyp and fibroid and those were removed.  [place] stool scale of 1.  Intake: coffee, protein shake, smart waggoner 1-1.5.  Lots of fruit and veggies with lean meats.  No real processed food.  No history of gastroenterologist.  Always feel bloated.  No work outside her home.    -SW       Subjective Pain    Able to rate subjective pain?  yes  -SW    Pre-Treatment Pain Level  6 back  -SW    Post-Treatment Pain Level  6  -SW       Posture/Observations    Posture- WNL  Posture is WNL  -SW    Posture/Observations Comments  patient presents to clinic with upright posture and normal gait.  No AD used.  No balance deficiencies noted.   -SW      User Key  (r) = Recorded By, (t) = Taken By, (c) = Cosigned By    Initials Name Provider Type    Tianna Ge, PT DPT Physical Therapist                     PT Assessment/Plan     Row Name 06/10/21 1300          PT Assessment    Functional Limitations  Limitation in home management;Limitations in community activities;Performance in leisure activities;Performance in self-care ADL  -SW     Impairments  Impaired muscle endurance;Impaired muscle length;Impaired postural alignment;Muscle strength;Pain;Poor body mechanics;Other (comment) Urinary incontinence and constipation  -SW     Assessment Comments  Patient is a 40 yo female presenting to clinic with PF incoordination with secondary NAJMA.  Her ailments are  complicated by long standing constipation.  Pt would benefit from skilled therapy to address coordination, uptrain weakened muscle and improve QOL.    -SW     Rehab Potential  Good  -SW     Patient/caregiver participated in establishment of treatment plan and goals  Yes  -SW     Patient would benefit from skilled therapy intervention  Yes  -SW        PT Plan    PT Frequency  1x/week  -     PT Plan Comments  bowel/bladder diary, downtrain/uptrain as appropriate, ther exercise and manual therapy as indicated for fascial release, stretching and stabilization to assist with ADL.  Palliative treatment for pain control as needed. Dietary and postural modifications as needed for ADL performance.   -       User Key  (r) = Recorded By, (t) = Taken By, (c) = Cosigned By    Initials Name Provider Type    Tianna Ge, PT DPT Physical Therapist            OP Exercises     Row Name 06/10/21 1300             Subjective Comments    Subjective Comments  UI has just started this past year.  If I sneeze or step too hard I leak.  Void frequency approx 5 per day.  Nocturia 1/night.  No leaks throughout the night.  Wears pads during the dayx 2/day.  Not saturated.  No straining noted with Urination.  Feels emptied with completed.  No pain with urination.  regular flow of urine noted.  Bowels have never moved normally.  Hurts her cervix when she does have BM.  BM 1x/week.  No use of medicaton to assist your bowels. Pt is taking 1 tablet of iron per day due to anemia.  She bled approximately every day for 1 year.  Hysteroscopy found a polyp and fibroid and those were removed.  [place] stool scale of 1.  Intake: coffee, protein shake, smart waggoner 1-1.5.  Lots of fruit and veggies with lean meats.  No real processed food.  No history of gastroenterologist.  Always feel bloated.  No work outside her home.    -SW         Subjective Pain    Able to rate subjective pain?  yes  -SW      Pre-Treatment Pain Level  6 back  -SW       Post-Treatment Pain Level  6  -SW         Exercise 1    Exercise Name 1  bladder diary completion   -SW      Additional Comments  3 day completion   -SW         Exercise 2    Exercise Name 2  pelvic rest position   -SW      Additional Comments  10 min post activity with breath trainin g  -SW         Exercise 3    Exercise Name 3  PF contraction with exhalation of breath  -SW      Additional Comments  breath support training with mod cues required.    -SW         Exercise 4    Exercise Name 4  toileting position  -SW      Additional Comments  able to demo with teach back.   -        User Key  (r) = Recorded By, (t) = Taken By, (c) = Cosigned By    Initials Name Provider Type    SW Tianna Lee, PT DPT Physical Therapist                      PT OP Goals     Row Name 06/10/21 1500          PT Short Term Goals    STG Date to Achieve  07/08/21  -     STG 1  Patient will be independent with bladder diary completion x3 consecutive days is a learning tool for the patient and an assessment tool for clinician to visualize normal function of bowel and bladder on a regular basis.  -SW     STG 1 Progress  New  -     STG 2  Patient to verbalize the role of dietary irritants and modify and eliminate those from daily intake to decrease urgency of the bladder and assist in prevention of urinary leakage.  -SW     STG 2 Progress  New  -     STG 3  Patient to demonstrate normal toileting postures to improve evacuation of bowel and bladder without straining required.  -SW     STG 3 Progress  New  -     STG 4  Patient to verbalize the need for proper hydration and demonstrate adequate intake that helps to support proper gut health.  -SW     STG 4 Progress  New  -     STG 5  Patient to verbalize the importance of dietary intake for gut health and help to modify diet such that it includes adequate amounts of fiber to assist with gut motility.  -SW     STG 5 Progress  New  -     STG 6  Patient to demonstrate normal  diaphragmatic breathing as to promote pelvic floor relaxation normal movement of the pelvic diaphragm with inhalation exhalation of breath x 5 min   -     STG 6 Progress  New  [facility] 7  Patient to show increased endurance of pelvic floor muscles such that she can incorporate 10-second holds with proper breast support and without demonstration of Valsalva.  -[facility] 7 Progress  [facility]        Long Term Goals    LTG Date to Achieve  12/31/21  -     LTG 1  Patient will improve daily voiding schedule with the use of urge suppression and monitoring of the bladder irritants such as she is able to maintain daily voids of at least every 3-4 waking hours.  -     LTG 1 Progress  New  [facility]     LTG 2  Patient will decrease urinary incontinence by at least 7580% such that she can dismiss pads per day and/or at least reduce to 1/day.  -     LTG 2 Progress  New  [facility]     LTG 3  Patient is subjectively report coordination of pelvic floor muscles such that she is able to perform evacuation of her bowels as well as micturition without the need for straining for emptying.  -[facility] 3 Progress  New  [facility] 4  Patient to demonstrate normalized coordination of pelvic floor in seated standing postures such as she is able to demonstrate normal recruitment patterns along surface EMG that are isolated and released without delay following contractions.  -[facility] Progress  New  [facility]     LTG 5  Patient have improved bowel activity such as [place] stool scale averages between a 3 or 4, evacuation without straining, at least 75% of the time  -[facility] 5 Progress  [facility] 6  Patient to improve symptoms such that her pelvic floor distress inventory short form 20 score is approximately 30 or less indicating improved overall health to the pelvic floor  -[facility] 6 Progress  [facility]        Time Calculation    PT Goal Re-Cert Due Date  07/08/21  -       User Key  (r) = Recorded By, (t) = Taken By, (c) =  Cosigned By    Initials Name Provider Type    Tianna Ge, PT DPT Physical Therapist          Therapy Education  Given: HEP, Symptoms/condition management  Program: New  How Provided: Verbal, Demonstration, Written  Provided to: Patient  Level of Understanding: Teach back education performed, Verbalized, Demonstrated               Time Calculation:   Start Time: 1303  Stop Time: 1401  Time Calculation (min): 58 min  Therapy Charges for Today     Code Description Service Date Service Provider Modifiers Qty    43967562152 HC PT EVAL MOD COMPLEXITY 4 6/10/2021 Tianna Lee, PT DPT GP 1                  Tianna Lee, PT DPT  6/10/2021

## 2021-06-15 ENCOUNTER — APPOINTMENT (OUTPATIENT)
Dept: PHYSICAL THERAPY | Facility: HOSPITAL | Age: 41
End: 2021-06-15

## 2021-06-28 ENCOUNTER — TELEPHONE (OUTPATIENT)
Dept: FAMILY MEDICINE CLINIC | Facility: CLINIC | Age: 41
End: 2021-06-28

## 2021-06-29 ENCOUNTER — OFFICE VISIT (OUTPATIENT)
Dept: FAMILY MEDICINE CLINIC | Facility: CLINIC | Age: 41
End: 2021-06-29

## 2021-06-29 VITALS
SYSTOLIC BLOOD PRESSURE: 114 MMHG | HEIGHT: 60 IN | BODY MASS INDEX: 28.57 KG/M2 | WEIGHT: 145.5 LBS | DIASTOLIC BLOOD PRESSURE: 80 MMHG | TEMPERATURE: 97.8 F | HEART RATE: 98 BPM | OXYGEN SATURATION: 97 %

## 2021-06-29 DIAGNOSIS — M54.42 CHRONIC BILATERAL LOW BACK PAIN WITH BILATERAL SCIATICA: Primary | ICD-10-CM

## 2021-06-29 DIAGNOSIS — R11.0 NAUSEA: ICD-10-CM

## 2021-06-29 DIAGNOSIS — F32.A DEPRESSION, UNSPECIFIED DEPRESSION TYPE: ICD-10-CM

## 2021-06-29 DIAGNOSIS — G89.29 CHRONIC BILATERAL LOW BACK PAIN WITH BILATERAL SCIATICA: Primary | ICD-10-CM

## 2021-06-29 DIAGNOSIS — Z87.19 H/O GASTROESOPHAGEAL REFLUX (GERD): ICD-10-CM

## 2021-06-29 DIAGNOSIS — M54.41 CHRONIC BILATERAL LOW BACK PAIN WITH BILATERAL SCIATICA: Primary | ICD-10-CM

## 2021-06-29 DIAGNOSIS — M19.90 ARTHRITIS: ICD-10-CM

## 2021-06-29 PROCEDURE — 99214 OFFICE O/P EST MOD 30 MIN: CPT | Performed by: FAMILY MEDICINE

## 2021-06-29 PROCEDURE — 96372 THER/PROPH/DIAG INJ SC/IM: CPT | Performed by: FAMILY MEDICINE

## 2021-06-29 RX ORDER — MAGNESIUM 200 MG
1 TABLET ORAL DAILY
Qty: 30 EACH | Refills: 0 | Status: SHIPPED | OUTPATIENT
Start: 2021-06-29

## 2021-06-29 RX ORDER — CELECOXIB 100 MG/1
100 CAPSULE ORAL 2 TIMES DAILY PRN
Qty: 60 CAPSULE | Refills: 0 | Status: SHIPPED | OUTPATIENT
Start: 2021-06-29 | End: 2021-08-11 | Stop reason: SDUPTHER

## 2021-06-29 RX ORDER — TRIAMCINOLONE ACETONIDE 40 MG/ML
40 INJECTION, SUSPENSION INTRA-ARTICULAR; INTRAMUSCULAR ONCE
Status: COMPLETED | OUTPATIENT
Start: 2021-06-29 | End: 2021-06-29

## 2021-06-29 RX ORDER — OMEPRAZOLE 40 MG/1
40 CAPSULE, DELAYED RELEASE ORAL DAILY
Qty: 30 CAPSULE | Refills: 0 | Status: SHIPPED | OUTPATIENT
Start: 2021-06-29

## 2021-06-29 RX ORDER — PROMETHAZINE HYDROCHLORIDE 12.5 MG/1
12.5 TABLET ORAL EVERY 8 HOURS PRN
Qty: 30 TABLET | Refills: 1 | Status: SHIPPED | OUTPATIENT
Start: 2021-06-29

## 2021-06-29 RX ORDER — VENLAFAXINE HYDROCHLORIDE 75 MG/1
75 CAPSULE, EXTENDED RELEASE ORAL NIGHTLY
Qty: 30 CAPSULE | Refills: 0 | Status: SHIPPED | OUTPATIENT
Start: 2021-06-29 | End: 2021-08-09

## 2021-06-29 RX ADMIN — TRIAMCINOLONE ACETONIDE 40 MG: 40 INJECTION, SUSPENSION INTRA-ARTICULAR; INTRAMUSCULAR at 15:42

## 2021-06-29 NOTE — PROGRESS NOTES
Chief Complaint  Back Pain and Nausea    Subjective          Review of Systems   Constitutional: Positive for fatigue. Negative for activity change, appetite change, chills, diaphoresis, fever, unexpected weight change and weight loss.   HENT: Negative for congestion, dental problem, drooling, ear discharge, ear pain, facial swelling, hearing loss, mouth sores, nosebleeds, postnasal drip, rhinorrhea, sinus pressure, sinus pain, sneezing, sore throat, tinnitus, trouble swallowing and voice change.    Eyes: Negative for photophobia, pain, discharge, redness, itching and visual disturbance.          Astigmatism.   Respiratory: Negative for apnea, cough, choking, chest tightness, shortness of breath, wheezing and stridor.         1 cig/day   Cardiovascular: Negative for chest pain, palpitations and leg swelling.   Gastrointestinal: Positive for abdominal pain and nausea. Negative for abdominal distention, anal bleeding, blood in stool, bowel incontinence, constipation, diarrhea, rectal pain and vomiting.   Endocrine: Negative for cold intolerance, heat intolerance, polydipsia, polyphagia and polyuria.   Genitourinary: Positive for bladder incontinence, enuresis, menstrual problem, pelvic pain and vaginal bleeding. Negative for decreased urine volume, difficulty urinating, dyspareunia, dysuria, flank pain, frequency, genital sores, hematuria, urgency, vaginal discharge and vaginal pain.        Heavy bleeding recurrent after Mirena out.     Musculoskeletal: Positive for back pain. Negative for gait problem, neck pain and neck stiffness.        Low back pain down L buttock   Skin: Negative for color change, pallor, rash and wound.   Allergic/Immunologic: Negative for environmental allergies, food allergies and immunocompromised state.   Neurological: Positive for weakness. Negative for dizziness, tingling, tremors, seizures, syncope, facial asymmetry, speech difficulty, light-headedness, numbness, headaches and  paresthesias.   Hematological: Negative for adenopathy. Does not bruise/bleed easily.   Psychiatric/Behavioral: Positive for dysphoric mood and sleep disturbance. Negative for agitation, behavioral problems, confusion, decreased concentration, hallucinations, self-injury and suicidal ideas. The patient is nervous/anxious. The patient is not hyperactive.          Sleeps usually 4-6hrs.       Viridiana Mota presents to [facility]  Back Pain  This is a recurrent problem. The current episode started more than 1 year ago. The problem occurs constantly. The problem has been rapidly worsening since onset. The pain is present in the sacro-iliac. The quality of the pain is described as aching, burning and stabbing. The pain radiates to the left foot, left knee, right knee and right thigh. The pain is at a severity of 7/10. The pain is the same all the time. The symptoms are aggravated by bending, position, sitting, standing and twisting. Stiffness is present all day. Associated symptoms include abdominal pain, bladder incontinence, leg pain, pelvic pain and weakness. Pertinent negatives include no bowel incontinence, chest pain, dysuria, fever, headaches, numbness, paresis, paresthesias, perianal numbness, tingling or weight loss. Risk factors include obesity and sedentary lifestyle. She has tried analgesics, heat, home exercises, muscle relaxant and NSAIDs for the symptoms. The treatment provided mild relief.   Nausea  This is a recurrent problem. The current episode started more than 1 month ago. Associated symptoms include abdominal pain, fatigue, nausea and weakness. Pertinent negatives include no chest pain, chills, congestion, coughing, diaphoresis, fever, headaches, neck pain, numbness, rash, sore throat or vomiting. Exacerbated by: iron supplement. Treatments tried: Phenergan helps. The treatment provided mild relief.   Anemia  Presents for follow-up visit. Symptoms include  "abdominal pain. There has been no bruising/bleeding easily, confusion, fever, light-headedness, pallor, palpitations, paresthesias or weight loss. Signs of blood loss that are present include vaginal bleeding.       Objective   Vital Signs:   /80 (BP Location: Left arm, Patient Position: Sitting, Cuff Size: Adult)   Pulse 98   Temp 97.8 °F (36.6 °C) (Temporal)   Ht 152.4 cm (60\")   Wt 66 kg (145 lb 8 oz)   SpO2 97%   BMI 28.42 kg/m²     Physical Exam  Vitals and nursing note reviewed.   Constitutional:       Appearance: Normal appearance. She is well-developed.   HENT:      Head: Normocephalic and atraumatic.      Right Ear: Tympanic membrane, ear canal and external ear normal.      Left Ear: Tympanic membrane, ear canal and external ear normal.      Nose: Nose normal.      Mouth/Throat:      Mouth: Mucous membranes are moist.      Pharynx: Oropharynx is clear.   Eyes:      Extraocular Movements: Extraocular movements intact.      Conjunctiva/sclera: Conjunctivae normal.      Pupils: Pupils are equal, round, and reactive to light.   Cardiovascular:      Rate and Rhythm: Normal rate and regular rhythm.      Heart sounds: Normal heart sounds.   Pulmonary:      Effort: Pulmonary effort is normal.      Breath sounds: Normal breath sounds.   Abdominal:      General: Bowel sounds are normal. There is no distension.      Palpations: Abdomen is soft.      Tenderness: There is no abdominal tenderness.   Musculoskeletal:         General: Tenderness present.      Cervical back: Normal range of motion and neck supple.      Comments: WB 6 with ROM of knees, back, hands.   Skin:     General: Skin is warm and dry.   Neurological:      Mental Status: She is alert and oriented to person, place, and time.      Cranial Nerves: No cranial nerve deficit.      Sensory: No sensory deficit.      Motor: No weakness.      Coordination: Coordination normal.      Gait: Gait normal.      Deep Tendon Reflexes: Reflexes normal. "   Psychiatric:         Mood and Affect: Mood normal.         Speech: Speech normal.         Behavior: Behavior normal.         Thought Content: Thought content normal.         Judgment: Judgment normal.        Result Review :     Common labs    Common Labsle 10/2/20 3/19/21 3/19/21 3/19/21 4/14/21 4/14/21     1018 1018 1018 [id number]   Glucose 98   84  117 (A)   BUN 9   15  15   Creatinine 0.74   0.81  0.73   eGFR Non  Am 87   78  88   eGFR African Am      106   Sodium 140   137  140   Potassium 4.4   4.6  4.1   Chloride 103   103  108 (A)   Calcium 9.5   9.6  8.3 (A)   Albumin 4.40   4.80     Total Bilirubin <0.2   0.2     Alkaline Phosphatase 71   79     AST (SGOT) 14   19     ALT (SGPT) 9   9     WBC  9.97   6.58    Hemoglobin  9.8 (A)   8.7 (A)    Hematocrit  34.7   29.4 (A)    Platelets  499 (A)   378    Total Cholesterol   218 (A)      Triglycerides   103      HDL Cholesterol   78 (A)      LDL Cholesterol    122 (A)      (A) Abnormal value            CMP    CMP 10/2/20 3/19/21 4/14/21   Glucose 98 84 117 (A)   BUN 9 15 15   Creatinine 0.74 0.81 0.73   eGFR Non  Am 87 78 88   eGFR African Am   106   Sodium 140 137 140   Potassium 4.4 4.6 4.1   Chloride 103 103 108 (A)   Calcium 9.5 9.6 8.3 (A)   Albumin 4.40 4.80    Total Bilirubin <0.2 0.2    Alkaline Phosphatase 71 79    AST (SGOT) 14 19    ALT (SGPT) 9 9    (A) Abnormal value            CBC    CBC 3/19/21 4/14/21   WBC 9.97 6.58   RBC 5.01 4.07   Hemoglobin 9.8 (A) 8.7 (A)   Hematocrit 34.7 29.4 (A)   MCV 69.3 (A) 72.2 (A)   MCH 19.6 (A) 21.4 (A)   MCHC 28.2 (A) 29.6 (A)   RDW 18.6 (A) 21.4 (A)   Platelets 499 (A) 378   (A) Abnormal value            CBC w/diff    CBC w/Diff 3/19/21 4/14/21   WBC 9.97 6.58   RBC 5.01 4.07   Hemoglobin 9.8 (A) 8.7 (A)   Hematocrit 34.7 29.4 (A)   MCV 69.3 (A) 72.2 (A)   MCH 19.6 (A) 21.4 (A)   MCHC 28.2 (A) 29.6 (A)   RDW 18.6 (A) 21.4 (A)   Platelets 499 (A) 378   (A) Abnormal value            Lipid Panel     Lipid Panel 3/19/21   Total Cholesterol 218 (A)   Triglycerides 103   HDL Cholesterol 78 (A)   VLDL Cholesterol 18   LDL Cholesterol  122 (A)   LDL/HDL Ratio 1.53   (A) Abnormal value            TSH    TSH 3/19/21   TSH 1.800           UA    Urinalysis 2/26/21   Ketones, UA Negative   Leukocytes, UA Negative                         Assessment and Plan    Diagnoses and all orders for this visit:    1. Chronic bilateral low back pain with bilateral sciatica (Primary)  -     XR Spine Lumbar 2 or 3 View (In Office)  -     triamcinolone acetonide (KENALOG-40) injection 40 mg    2. Arthritis  -     celecoxib (CeleBREX) 100 MG capsule; Take 1 capsule by mouth 2 (Two) Times a Day As Needed for Mild Pain .  Dispense: 60 capsule; Refill: 0  -     Magnesium 200 MG tablet; Take 1 tablet by mouth Daily.  Dispense: 30 each; Refill: 0    3. H/O gastroesophageal reflux (GERD)  -     omeprazole (priLOSEC) 40 MG capsule; Take 1 capsule by mouth Daily.  Dispense: 30 capsule; Refill: 0    4. Depression, unspecified depression type  -     venlafaxine XR (EFFEXOR-XR) 75 MG 24 hr capsule; Take 1 capsule by mouth Every Night.  Dispense: 30 capsule; Refill: 0    5. Nausea  -     promethazine (PHENERGAN) 12.5 MG tablet; Take 1 tablet by mouth Every 8 (Eight) Hours As Needed for Nausea or Vomiting.  Dispense: 30 tablet; Refill: 1        Follow Up   Return in about 4 weeks (around 7/27/2021), or if symptoms worsen or fail to improve, for Recheck.  Patient was given instructions and counseling regarding her condition or for health maintenance advice. Please see specific information pulled into the AVS if appropriate.       Answers for HPI/ROS submitted by the patient on 6/27/2021  What is the primary reason for your visit?: Back Pain        This document has been electronically signed by Lane Rod MD on June 29, 2021

## 2021-07-15 ENCOUNTER — OFFICE VISIT (OUTPATIENT)
Dept: OBSTETRICS AND GYNECOLOGY | Facility: CLINIC | Age: 41
End: 2021-07-15

## 2021-07-15 VITALS
WEIGHT: 147 LBS | SYSTOLIC BLOOD PRESSURE: 104 MMHG | DIASTOLIC BLOOD PRESSURE: 62 MMHG | BODY MASS INDEX: 28.86 KG/M2 | HEIGHT: 60 IN

## 2021-07-15 DIAGNOSIS — D64.9 ANEMIA, UNSPECIFIED TYPE: ICD-10-CM

## 2021-07-15 DIAGNOSIS — Z09 FOLLOW UP: Primary | ICD-10-CM

## 2021-07-15 PROCEDURE — 99212 OFFICE O/P EST SF 10 MIN: CPT | Performed by: NURSE PRACTITIONER

## 2021-07-15 NOTE — PROGRESS NOTES
Subjective   Viridiana Mota is a 41 y.o. here for her postop appt    Viridiana Mota is a 41 yr old  who presents today for her post-op appt for a removal of fibroid delivering per cervical os done back in April. Pt had missed her appts in April for post-op with Dr. Rooney. Pt is doing well, her period are now occurring every month lasting only for 3-4 days. She is not having any breakthrough bleeding, spotting, pelvic pain, or vaginal discharge. Declines birth control today. She is a smoker. Taking iron supplement every other day. Hct 8.7 and Hgb 29 three months ago.       The following portions of the patient's history were reviewed and updated as appropriate: allergies, current medications, past family history, past medical history, past social history, past surgical history and problem list.    Review of Systems   Constitutional: Negative for fatigue and fever.   Genitourinary: Negative for menstrual problem, pelvic pain, pelvic pressure, vaginal bleeding, vaginal discharge and vaginal pain.       Objective   Physical Exam  Vitals and nursing note reviewed. Exam conducted with a chaperone present.   Constitutional:       Appearance: She is well-developed.   HENT:      Head: Normocephalic.   Pulmonary:      Effort: Pulmonary effort is normal.   Genitourinary:     General: Normal vulva.      Labia:         Right: No rash, tenderness, lesion or injury.         Left: No rash, tenderness, lesion or injury.       Vagina: Normal.      Cervix: No discharge, friability, lesion, erythema, cervical bleeding or eversion.      Rectum: Normal.   Musculoskeletal:         General: Normal range of motion.      Cervical back: Normal range of motion.   Skin:     General: Skin is warm and dry.   Neurological:      Mental Status: She is alert and oriented to person, place, and time.   Psychiatric:         Behavior: Behavior normal.           Assessment/Plan   Diagnoses and all orders for this visit:    1. Follow up  (Primary)    2. Anemia, unspecified type  -     CBC Auto Differential      Counseled to consider birth control until menopause. Reviewed normal pap smear; repeat in 5 years. Discussed anemia; pt to do CBC today. Return if menstrual bleeding problems occur again.  Return in one year or sooner as needed.

## 2021-07-26 ENCOUNTER — TELEPHONE (OUTPATIENT)
Dept: FAMILY MEDICINE CLINIC | Facility: CLINIC | Age: 41
End: 2021-07-26

## 2021-08-05 ENCOUNTER — TELEPHONE (OUTPATIENT)
Dept: FAMILY MEDICINE CLINIC | Facility: CLINIC | Age: 41
End: 2021-08-05

## 2021-08-09 ENCOUNTER — OFFICE VISIT (OUTPATIENT)
Dept: FAMILY MEDICINE CLINIC | Facility: CLINIC | Age: 41
End: 2021-08-09

## 2021-08-09 VITALS
TEMPERATURE: 97.8 F | OXYGEN SATURATION: 98 % | BODY MASS INDEX: 27.86 KG/M2 | WEIGHT: 141.9 LBS | SYSTOLIC BLOOD PRESSURE: 110 MMHG | DIASTOLIC BLOOD PRESSURE: 60 MMHG | HEIGHT: 60 IN | HEART RATE: 87 BPM

## 2021-08-09 DIAGNOSIS — F32.A DEPRESSION, UNSPECIFIED DEPRESSION TYPE: ICD-10-CM

## 2021-08-09 DIAGNOSIS — G89.29 CHRONIC BILATERAL LOW BACK PAIN WITH LEFT-SIDED SCIATICA: ICD-10-CM

## 2021-08-09 DIAGNOSIS — M54.42 CHRONIC BILATERAL LOW BACK PAIN WITH LEFT-SIDED SCIATICA: ICD-10-CM

## 2021-08-09 DIAGNOSIS — F90.0 ATTENTION DEFICIT HYPERACTIVITY DISORDER (ADHD), PREDOMINANTLY INATTENTIVE TYPE: ICD-10-CM

## 2021-08-09 DIAGNOSIS — M19.90 ARTHRITIS: ICD-10-CM

## 2021-08-09 PROCEDURE — 99214 OFFICE O/P EST MOD 30 MIN: CPT | Performed by: FAMILY MEDICINE

## 2021-08-09 NOTE — PROGRESS NOTES
Chief Complaint  Abdominal Pain and ADD   ' Stopped meds, better off anti depressant. Will be starting a new job, hope to restart ADD med'.     Subjective          Review of Systems   Constitutional: Negative for activity change, appetite change, unexpected weight change and weight loss.   HENT: Negative for dental problem, drooling, ear discharge, ear pain, facial swelling, hearing loss, mouth sores, nosebleeds, postnasal drip, rhinorrhea, sinus pressure, sinus pain, sneezing, tinnitus, trouble swallowing and voice change.    Eyes: Negative for photophobia, pain, discharge, redness, itching and visual disturbance.        Eye exam 1 yr, [place]  Astigmatism.   Respiratory: Negative for apnea, choking, chest tightness, shortness of breath, wheezing and stridor.         1 cig/day   Cardiovascular: Negative for palpitations and leg swelling.   Gastrointestinal: Negative for abdominal distention, anal bleeding, blood in stool, bowel incontinence, constipation, diarrhea and rectal pain.   Endocrine: Negative for cold intolerance, heat intolerance, polydipsia, polyphagia and polyuria.   Genitourinary: Positive for bladder incontinence and menorrhagia. Negative for decreased urine volume, difficulty urinating, dyspareunia, dysuria, enuresis, flank pain, frequency, genital sores, hematuria, menstrual problem, pelvic pain, urgency, vaginal bleeding, vaginal discharge and vaginal pain.   Musculoskeletal: Positive for back pain. Negative for gait problem and neck stiffness.        Low back pain down L buttock.   Skin: Negative for color change, pallor and wound.   Allergic/Immunologic: Negative for environmental allergies, food allergies and immunocompromised state.   Neurological: Negative for dizziness, tingling, tremors, seizures, syncope, facial asymmetry, speech difficulty, light-headedness and paresthesias.   Hematological: Negative for adenopathy. Does not bruise/bleed easily.   Psychiatric/Behavioral: Positive for dysphoric  "mood and sleep disturbance. Negative for agitation, behavioral problems, confusion, decreased concentration, hallucinations, self-injury and suicidal ideas. The patient is nervous/anxious. The patient is not hyperactive.          Sleeps usually 4-6hrs.       Viridiana Mota presents to [facility]  H/O ADHD     Back Pain  This is a recurrent problem. The current episode started more than 1 year ago. The problem occurs constantly. The problem has been rapidly worsening since onset. The pain is present in the sacro-iliac. The quality of the pain is described as aching, burning and stabbing. The pain radiates to the left foot, left knee, right knee and right thigh. The pain is at a severity of 2/10. The pain is the same all the time. The symptoms are aggravated by bending, position, sitting, standing and twisting. Stiffness is present all day. Associated symptoms include bladder incontinence and leg pain. Pertinent negatives include no bowel incontinence, dysuria, paresis, paresthesias, pelvic pain, perianal numbness, tingling or weight loss. Risk factors include obesity and sedentary lifestyle. She has tried analgesics, heat, home exercises, muscle relaxant and NSAIDs for the symptoms. The treatment provided mild relief.   Anemia  Presents for follow-up visit. There has been no bruising/bleeding easily, confusion, light-headedness, pallor, palpitations, paresthesias or weight loss. Signs of blood loss that are present include menorrhagia. Signs of blood loss that are not present include vaginal bleeding.       Objective   Vital Signs:   /60 (BP Location: Right arm, Patient Position: Sitting, Cuff Size: Adult)   Pulse 87   Temp 97.8 °F (36.6 °C) (Temporal)   Ht 152.4 cm (60\")   Wt 64.4 kg (141 lb 14.4 oz)   SpO2 98%   BMI 27.71 kg/m²     Physical Exam  Vitals and nursing note reviewed.   Constitutional:       Appearance: Normal appearance. She is well-developed.   HENT:      " Head: Normocephalic and atraumatic.      Right Ear: Tympanic membrane, ear canal and external ear normal.      Left Ear: Tympanic membrane, ear canal and external ear normal.      Nose: Nose normal.      Mouth/Throat:      Mouth: Mucous membranes are moist.      Pharynx: Oropharynx is clear.   Eyes:      Extraocular Movements: Extraocular movements intact.      Conjunctiva/sclera: Conjunctivae normal.      Pupils: Pupils are equal, round, and reactive to light.   Cardiovascular:      Rate and Rhythm: Normal rate and regular rhythm.      Heart sounds: Normal heart sounds.   Pulmonary:      Effort: Pulmonary effort is normal.      Breath sounds: Normal breath sounds.   Abdominal:      General: Bowel sounds are normal. There is no distension.      Palpations: Abdomen is soft.      Tenderness: There is no abdominal tenderness.   Musculoskeletal:         General: Tenderness present.      Cervical back: Normal range of motion and neck supple.      Comments: WB 6 with ROM of knees, back, hands.   Skin:     General: Skin is warm and dry.   Neurological:      Mental Status: She is alert and oriented to person, place, and time.      Cranial Nerves: No cranial nerve deficit.      Sensory: No sensory deficit.      Motor: No weakness.      Coordination: Coordination normal.      Gait: Gait normal.      Deep Tendon Reflexes: Reflexes normal.   Psychiatric:         Mood and Affect: Mood normal.         Speech: Speech normal.         Behavior: Behavior normal.         Thought Content: Thought content normal.         Judgment: Judgment normal.        Result Review :                 Assessment and Plan    Diagnoses and all orders for this visit:    1. Depression, unspecified depression type    2. Chronic bilateral low back pain with left-sided sciatica  -     celecoxib (CeleBREX) 100 MG capsule; Take 1 capsule by mouth 2 (Two) Times a Day As Needed for Mild Pain .  Dispense: 60 capsule; Refill: 0    3. Attention deficit hyperactivity  disorder (ADHD), predominantly inattentive type    4. Arthritis  -     celecoxib (CeleBREX) 100 MG capsule; Take 1 capsule by mouth 2 (Two) Times a Day As Needed for Mild Pain .  Dispense: 60 capsule; Refill: 0          This document has been electronically signed by Lane Rod MD on August 11, 2021 21:37 CDT        Follow Up   Return in about 1 week (around 8/16/2021).  Patient was given instructions and counseling regarding her condition or for health maintenance advice. Please see specific information pulled into the AVS if appropriate.         This document has been electronically signed by Lane Rod MD

## 2021-08-11 PROBLEM — F90.0 ATTENTION DEFICIT HYPERACTIVITY DISORDER (ADHD), PREDOMINANTLY INATTENTIVE TYPE: Status: ACTIVE | Noted: 2021-08-11

## 2021-08-11 RX ORDER — CELECOXIB 100 MG/1
100 CAPSULE ORAL 2 TIMES DAILY PRN
Qty: 60 CAPSULE | Refills: 0 | Status: SHIPPED | OUTPATIENT
Start: 2021-08-11

## 2021-08-13 ENCOUNTER — TELEPHONE (OUTPATIENT)
Dept: FAMILY MEDICINE CLINIC | Facility: CLINIC | Age: 41
End: 2021-08-13

## 2021-08-13 NOTE — TELEPHONE ENCOUNTER
Tried calling to confirm appointment and ask COVID screening questions.  No answer left detailed voice message

## 2021-12-15 ENCOUNTER — LAB (OUTPATIENT)
Dept: LAB | Facility: HOSPITAL | Age: 41
End: 2021-12-15

## 2021-12-15 ENCOUNTER — TELEPHONE (OUTPATIENT)
Dept: FAMILY MEDICINE CLINIC | Facility: CLINIC | Age: 41
End: 2021-12-15

## 2021-12-15 DIAGNOSIS — R39.15 URINARY URGENCY: ICD-10-CM

## 2021-12-15 DIAGNOSIS — R30.0 DYSURIA: ICD-10-CM

## 2021-12-15 DIAGNOSIS — R35.0 URINARY FREQUENCY: ICD-10-CM

## 2021-12-15 DIAGNOSIS — R30.0 DYSURIA: Primary | ICD-10-CM

## 2021-12-15 PROCEDURE — 87086 URINE CULTURE/COLONY COUNT: CPT

## 2021-12-15 PROCEDURE — 81001 URINALYSIS AUTO W/SCOPE: CPT

## 2021-12-15 RX ORDER — NITROFURANTOIN 25; 75 MG/1; MG/1
100 CAPSULE ORAL 2 TIMES DAILY
Qty: 10 CAPSULE | Refills: 0 | Status: SHIPPED | OUTPATIENT
Start: 2021-12-15

## 2021-12-15 NOTE — TELEPHONE ENCOUNTER
Pt having difficulty urinating. Urinary urgency and frequency however does not have much output.   Please advise.

## 2021-12-15 NOTE — TELEPHONE ENCOUNTER
Notified pt to have UA done and then can get antibiotic started. Pt voiced understanding. Order placed for UA.

## 2021-12-16 LAB
BACTERIA UR QL AUTO: ABNORMAL /HPF
BILIRUB UR QL STRIP: ABNORMAL
CLARITY UR: CLEAR
COLOR UR: ABNORMAL
GLUCOSE UR STRIP-MCNC: NEGATIVE MG/DL
HGB UR QL STRIP.AUTO: NEGATIVE
HYALINE CASTS UR QL AUTO: ABNORMAL /LPF
KETONES UR QL STRIP: NEGATIVE
LEUKOCYTE ESTERASE UR QL STRIP.AUTO: ABNORMAL
NITRITE UR QL STRIP: POSITIVE
PH UR STRIP.AUTO: 7 [PH] (ref 5–8)
PROT UR QL STRIP: NEGATIVE
RBC # UR STRIP: ABNORMAL /HPF
REF LAB TEST METHOD: ABNORMAL
SP GR UR STRIP: 1.02 (ref 1–1.03)
SQUAMOUS #/AREA URNS HPF: ABNORMAL /HPF
UROBILINOGEN UR QL STRIP: ABNORMAL
WBC # UR STRIP: ABNORMAL /HPF

## 2021-12-17 LAB — BACTERIA SPEC AEROBE CULT: NO GROWTH

## (undated) DEVICE — PREP PVP-I 7.5P BT 4OZ

## (undated) DEVICE — PENCL ES MEGADINE EZ/CLEAN BUTN W/HOLSTR 10FT

## (undated) DEVICE — GLV SURG SIGNATURE ESSENTIAL PF LTX SZ7

## (undated) DEVICE — DRP SURG U/BUTT W/PCH BACK STRL

## (undated) DEVICE — SOL IRR H2O BTL 1000ML STRL

## (undated) DEVICE — TUBING, SUCTION, 3/16" X 6', STRAIGHT: Brand: MEDLINE

## (undated) DEVICE — SUT VICRYL 0 6X18IN UNDYED TIES 45 CM

## (undated) DEVICE — SUT GUT CHRM 2/0 SH 27IN G123H

## (undated) DEVICE — GLV SURG SIGNATURE ESSENTIAL PF LTX SZ6

## (undated) DEVICE — CP SLF SEAL HYSTERSCOPE 2MM/HL

## (undated) DEVICE — LIGHT HANDLE: Brand: DEVON

## (undated) DEVICE — SUT VIC 2/0 SH 27IN

## (undated) DEVICE — GOWN,PREVENTION PLUS,XLONG/XLARGE,STRL: Brand: MEDLINE

## (undated) DEVICE — STERILE POLYISOPRENE POWDER-FREE SURGICAL GLOVES WITH EMOLLIENT COATING: Brand: PROTEXIS

## (undated) DEVICE — SUT  GUT PLAIN 2/0 CT1 27IN 843H

## (undated) DEVICE — SHEET,DRAPE,UNDERBUTTOCK,GRAD POUCH,PORT: Brand: MEDLINE

## (undated) DEVICE — PREP SOL DYNA-HEX CHG LIQ 4% BT 16OZ

## (undated) DEVICE — SPNG LAP 18X18IN LF STRL PK/5

## (undated) DEVICE — SOL IRR NACL 0.9PCT 3000ML

## (undated) DEVICE — GLV SURG TRIUMPH LT PF LTX 7.5 STRL

## (undated) DEVICE — CONTAINER,SPECIMEN,OR STERILE,4OZ: Brand: MEDLINE

## (undated) DEVICE — SUT MONOCRYL 4/0 PS2 27IN Y426H ETY426H

## (undated) DEVICE — PATIENT RETURN ELECTRODE, SINGLE-USE, CONTACT QUALITY MONITORING, ADULT, WITH 9FT CORD, FOR PATIENTS WEIGING OVER 33LBS. (15KG): Brand: MEGADYNE

## (undated) DEVICE — GAUZE,SPONGE,4"X4",16PLY,XRAY,STRL,LF: Brand: MEDLINE

## (undated) DEVICE — SUT VIC 0 CT1 36IN J946H

## (undated) DEVICE — DEV UTERINE EPLORA1 SHRP W/VACULOK SYR 3MM

## (undated) DEVICE — TOTAL TRAY, 16FR 10ML SIL FOLEY, URN: Brand: MEDLINE

## (undated) DEVICE — GLV SURG TRIUMPH ORTHO W/ALOE PF LTX 6.5 STRL

## (undated) DEVICE — CYSTO/BLADDER IRRIGATION SET, REGULATING CLAMP

## (undated) DEVICE — SUT GUT CHRM 2/0 CT1 36IN 923H

## (undated) DEVICE — UNDYED BRAIDED (POLYGLACTIN 910), SYNTHETIC ABSORBABLE SUTURE: Brand: COATED VICRYL

## (undated) DEVICE — DRAPE,LAPAROSCOPY,GUSS,STERILE: Brand: MEDLINE

## (undated) DEVICE — NEEDLE, QUINCKE 22GX3.5": Brand: MEDLINE INDUSTRIES, INC.

## (undated) DEVICE — ELECTRD BLD MEGADYNE 6.5IN SS

## (undated) DEVICE — PREP SOL POVIDONE/IODINE BT 4OZ

## (undated) DEVICE — WRAP LEG 31X48X6IN STRL

## (undated) DEVICE — GLV SURG NEOLON 2G PF LF 6.5 STRL

## (undated) DEVICE — SAFESECURE,SECUREMENT,FOLEY CATH,STERILE: Brand: MEDLINE

## (undated) DEVICE — SPNG GZ WOVN 4X4IN 12PLY 10/BX STRL

## (undated) DEVICE — PLUG,CATHETER,DRAINAGE PROTECTOR,TUBE: Brand: MEDLINE

## (undated) DEVICE — GLV SURG NEOLON 2G PF LF 5.5 STRL

## (undated) DEVICE — GLV SURG TRIUMPH LT PF LTX 7 STRL

## (undated) DEVICE — TP SXN YANKR BLB TIP W/TBG 10F LF STRL

## (undated) DEVICE — SOL IRR NACL 0.9PCT BT 1000ML

## (undated) DEVICE — SYR 10ML

## (undated) DEVICE — PK MAJ PROC LF 60

## (undated) DEVICE — COVER,MAYO STAND,STERILE: Brand: MEDLINE

## (undated) DEVICE — PK D AND C 60